# Patient Record
Sex: MALE | Race: WHITE | NOT HISPANIC OR LATINO | Employment: FULL TIME | URBAN - METROPOLITAN AREA
[De-identification: names, ages, dates, MRNs, and addresses within clinical notes are randomized per-mention and may not be internally consistent; named-entity substitution may affect disease eponyms.]

---

## 2017-03-13 PROBLEM — B07.9 WARTS: Status: ACTIVE | Noted: 2017-03-13

## 2017-03-14 ENCOUNTER — OFFICE VISIT (OUTPATIENT)
Dept: FAMILY MEDICINE CLINIC | Facility: CLINIC | Age: 32
End: 2017-03-14

## 2017-03-14 VITALS
SYSTOLIC BLOOD PRESSURE: 112 MMHG | HEIGHT: 67 IN | OXYGEN SATURATION: 98 % | DIASTOLIC BLOOD PRESSURE: 80 MMHG | HEART RATE: 81 BPM | TEMPERATURE: 98.5 F | WEIGHT: 180 LBS | BODY MASS INDEX: 28.25 KG/M2

## 2017-03-14 DIAGNOSIS — L23.7 POISON IVY DERMATITIS: Primary | ICD-10-CM

## 2017-03-14 PROBLEM — L25.9 CD (CONTACT DERMATITIS): Status: ACTIVE | Noted: 2017-03-14

## 2017-03-14 PROCEDURE — 96372 THER/PROPH/DIAG INJ SC/IM: CPT | Performed by: NURSE PRACTITIONER

## 2017-03-14 PROCEDURE — 99213 OFFICE O/P EST LOW 20 MIN: CPT | Performed by: NURSE PRACTITIONER

## 2017-03-14 RX ORDER — METHYLPREDNISOLONE ACETATE 40 MG/ML
40 INJECTION, SUSPENSION INTRA-ARTICULAR; INTRALESIONAL; INTRAMUSCULAR; SOFT TISSUE ONCE
Status: COMPLETED | OUTPATIENT
Start: 2017-03-14 | End: 2017-03-14

## 2017-03-14 RX ADMIN — METHYLPREDNISOLONE ACETATE 40 MG: 40 INJECTION, SUSPENSION INTRA-ARTICULAR; INTRALESIONAL; INTRAMUSCULAR; SOFT TISSUE at 11:33

## 2017-03-14 NOTE — PATIENT INSTRUCTIONS
Fat and Cholesterol Restricted Diet  High levels of fat and cholesterol in your blood may lead to various health problems, such as diseases of the heart, blood vessels, gallbladder, liver, and pancreas. Fats are concentrated sources of energy that come in various forms. Certain types of fat, including saturated fat, may be harmful in excess. Cholesterol is a substance needed by your body in small amounts. Your body makes all the cholesterol it needs. Excess cholesterol comes from the food you eat.  When you have high levels of cholesterol and saturated fat in your blood, health problems can develop because the excess fat and cholesterol will gather along the walls of your blood vessels, causing them to narrow. Choosing the right foods will help you control your intake of fat and cholesterol. This will help keep the levels of these substances in your blood within normal limits and reduce your risk of disease.  WHAT IS MY PLAN?  Your health care provider recommends that you:  · Get no more than __________ % of the total calories in your daily diet from fat.  · Limit your intake of saturated fat to less than ______% of your total calories each day.  · Limit the amount of cholesterol in your diet to less than _________mg per day.  WHAT TYPES OF FAT SHOULD I CHOOSE?  · Choose healthy fats more often. Choose monounsaturated and polyunsaturated fats, such as olive and canola oil, flaxseeds, walnuts, almonds, and seeds.  · Eat more omega-3 fats. Good choices include salmon, mackerel, sardines, tuna, flaxseed oil, and ground flaxseeds. Aim to eat fish at least two times a week.  · Limit saturated fats. Saturated fats are primarily found in animal products, such as meats, butter, and cream. Plant sources of saturated fats include palm oil, palm kernel oil, and coconut oil.  · Avoid foods with partially hydrogenated oils in them. These contain trans fats. Examples of foods that contain trans fats are stick margarine, some tub  "margarines, cookies, crackers, and other baked goods.  WHAT GENERAL GUIDELINES DO I NEED TO FOLLOW?  These guidelines for healthy eating will help you control your intake of fat and cholesterol:  · Check food labels carefully to identify foods with trans fats or high amounts of saturated fat.  · Fill one half of your plate with vegetables and green salads.  · Fill one fourth of your plate with whole grains. Look for the word \"whole\" as the first word in the ingredient list.  · Fill one fourth of your plate with lean protein foods.  · Limit fruit to two servings a day. Choose fruit instead of juice.  · Eat more foods that contain soluble fiber. Examples of foods that contain this type of fiber are apples, broccoli, carrots, beans, peas, and barley. Aim to get 20-30 g of fiber per day.  · Eat more home-cooked food and less restaurant, buffet, and fast food.  · Limit or avoid alcohol.  · Limit foods high in starch and sugar.  · Limit fried foods.  · Cook foods using methods other than frying. Baking, boiling, grilling, and broiling are all great options.  · Lose weight if you are overweight. Losing just 5-10% of your initial body weight can help your overall health and prevent diseases such as diabetes and heart disease.  WHAT FOODS CAN I EAT?  Grains  Whole grains, such as whole wheat or whole grain breads, crackers, cereals, and pasta. Unsweetened oatmeal, bulgur, barley, quinoa, or brown rice. Corn or whole wheat flour tortillas.  Vegetables  Fresh or frozen vegetables (raw, steamed, roasted, or grilled). Green salads.  Fruits  All fresh, canned (in natural juice), or frozen fruits.  Meat and Other Protein Products  Ground beef (85% or leaner), grass-fed beef, or beef trimmed of fat. Skinless chicken or turkey. Ground chicken or turkey. Pork trimmed of fat. All fish and seafood. Eggs. Dried beans, peas, or lentils. Unsalted nuts or seeds. Unsalted canned or dry beans.  Dairy  Low-fat dairy products, such as skim or " 1% milk, 2% or reduced-fat cheeses, low-fat ricotta or cottage cheese, or plain low-fat yogurt.  Fats and Oils  Tub margarines without trans fats. Light or reduced-fat mayonnaise and salad dressings. Avocado. Olive, canola, sesame, or safflower oils. Natural peanut or almond butter (choose ones without added sugar and oil).  The items listed above may not be a complete list of recommended foods or beverages. Contact your dietitian for more options.  WHAT FOODS ARE NOT RECOMMENDED?  Grains  White bread. White pasta. White rice. Cornbread. Bagels, pastries, and croissants. Crackers that contain trans fat.  Vegetables  White potatoes. Corn. Creamed or fried vegetables. Vegetables in a cheese sauce.  Fruits  Dried fruits. Canned fruit in light or heavy syrup. Fruit juice.  Meat and Other Protein Products  Fatty cuts of meat. Ribs, chicken wings, adasm, sausage, bologna, salami, chitterlings, fatback, hot dogs, bratwurst, and packaged luncheon meats. Liver and organ meats.  Dairy  Whole or 2% milk, cream, half-and-half, and cream cheese. Whole milk cheeses. Whole-fat or sweetened yogurt. Full-fat cheeses. Nondairy creamers and whipped toppings. Processed cheese, cheese spreads, or cheese curds.  Sweets and Desserts  Corn syrup, sugars, honey, and molasses. Candy. Jam and jelly. Syrup. Sweetened cereals. Cookies, pies, cakes, donuts, muffins, and ice cream.  Fats and Oils  Butter, stick margarine, lard, shortening, ghee, or adams fat. Coconut, palm kernel, or palm oils.  Beverages  Alcohol. Sweetened drinks (such as sodas, lemonade, and fruit drinks or punches).  The items listed above may not be a complete list of foods and beverages to avoid. Contact your dietitian for more information.     This information is not intended to replace advice given to you by your health care provider. Make sure you discuss any questions you have with your health care provider.     Document Released: 12/18/2006 Document Revised: 01/08/2016  Document Reviewed: 03/18/2015  [employer] Interactive Patient Education ©2016 [employer]  Exercising to Lose Weight  Exercising can help you to lose weight. In order to lose weight through exercise, you need to do vigorous-intensity exercise. You can tell that you are exercising with vigorous intensity if you are breathing very hard and fast and cannot hold a conversation while exercising.  Moderate-intensity exercise helps to maintain your current weight. You can tell that you are exercising at a moderate level if you have a higher heart rate and faster breathing, but you are still able to hold a conversation.  HOW OFTEN SHOULD I EXERCISE?  Choose an activity that you enjoy and set realistic goals. Your health care provider can help you to make an activity plan that works for you. Exercise regularly as directed by your health care provider. This may include:  · Doing resistance training twice each week, such as:    Push-ups.    Sit-ups.    Lifting weights.    Using resistance bands.  · Doing a given intensity of exercise for a given amount of time. Choose from these options:    150 minutes of moderate-intensity exercise every week.    75 minutes of vigorous-intensity exercise every week.    A mix of moderate-intensity and vigorous-intensity exercise every week.  Children, pregnant women, people who are out of shape, people who are overweight, and older adults may need to consult a health care provider for individual recommendations. If you have any sort of medical condition, be sure to consult your health care provider before starting a new exercise program.  WHAT ARE SOME ACTIVITIES THAT CAN HELP ME TO LOSE WEIGHT?   · Walking at a rate of at least 4.5 miles an hour.  · Jogging or running at a rate of 5 miles per hour.  · Biking at a rate of at least 10 miles per hour.  · Lap swimming.  · Roller-skating or in-line skating.  · Cross-country skiing.  · Vigorous competitive sports, such as football, basketball, and  soccer.  · Jumping rope.  · Aerobic dancing.  HOW CAN I BE MORE ACTIVE IN MY DAY-TO-DAY ACTIVITIES?  · Use the stairs instead of the elevator.  · Take a walk during your lunch break.  · If you drive, park your car farther away from work or school.  · If you take public transportation, get off one stop early and walk the rest of the way.  · Make all of your phone calls while standing up and walking around.  · Get up, stretch, and walk around every 30 minutes throughout the day.  WHAT GUIDELINES SHOULD I FOLLOW WHILE EXERCISING?  · Do not exercise so much that you hurt yourself, feel dizzy, or get very short of breath.  · Consult your health care provider prior to starting a new exercise program.  · Wear comfortable clothes and shoes with good support.  · Drink plenty of water while you exercise to prevent dehydration or heat stroke. Body water is lost during exercise and must be replaced.  · Work out until you breathe faster and your heart beats faster.     This information is not intended to replace advice given to you by your health care provider. Make sure you discuss any questions you have with your health care provider.     Document Released: 01/20/2012 Document Revised: 01/08/2016 Document Reviewed: 05/21/2015  [employer] Interactive Patient Education ©2016 [employer]

## 2017-06-12 ENCOUNTER — OFFICE VISIT (OUTPATIENT)
Dept: FAMILY MEDICINE CLINIC | Facility: CLINIC | Age: 32
End: 2017-06-12

## 2017-06-12 VITALS
OXYGEN SATURATION: 97 % | SYSTOLIC BLOOD PRESSURE: 104 MMHG | BODY MASS INDEX: 27 KG/M2 | HEIGHT: 67 IN | DIASTOLIC BLOOD PRESSURE: 76 MMHG | HEART RATE: 65 BPM | TEMPERATURE: 99.4 F | WEIGHT: 172 LBS

## 2017-06-12 DIAGNOSIS — B36.0 TINEA VERSICOLOR: Primary | ICD-10-CM

## 2017-06-12 PROCEDURE — 99213 OFFICE O/P EST LOW 20 MIN: CPT | Performed by: NURSE PRACTITIONER

## 2017-06-12 RX ORDER — FLUCONAZOLE 100 MG/1
100 TABLET ORAL DAILY
Qty: 1 TABLET | Refills: 1 | Status: SHIPPED | OUTPATIENT
Start: 2017-06-12 | End: 2018-01-02

## 2017-06-12 RX ORDER — CLOTRIMAZOLE 1 %
CREAM (GRAM) TOPICAL 2 TIMES DAILY
Qty: 30 G | Refills: 1 | Status: SHIPPED | OUTPATIENT
Start: 2017-06-12 | End: 2018-01-02

## 2017-06-12 NOTE — PROGRESS NOTES
Eber Brito is a 32 y.o. male.     History of Present Illness Patient complains of white, non-pruritic rash on right shoulder for one month. It is now spreading up onto his neck. No treatment.    The following portions of the patient's history were reviewed and updated as appropriate: allergies, current medications, past family history, past medical history, past social history, past surgical history and problem list.    Review of Systems   Constitutional: Negative for fatigue, fever and unexpected weight change.   HENT: Negative for congestion, hearing loss, nosebleeds, rhinorrhea, sore throat, trouble swallowing and voice change.    Eyes: Negative for pain, discharge, redness and visual disturbance.   Respiratory: Negative for cough, chest tightness, shortness of breath and wheezing.    Cardiovascular: Negative for chest pain, palpitations and leg swelling.   Gastrointestinal: Negative for abdominal distention, abdominal pain, anal bleeding, blood in stool, constipation, diarrhea, nausea and vomiting.   Endocrine: Negative for cold intolerance, heat intolerance, polydipsia, polyphagia and polyuria.   Genitourinary: Negative for dysuria, flank pain, frequency and hematuria.   Musculoskeletal: Negative for arthralgias, gait problem, joint swelling and myalgias.   Skin: Positive for rash. Negative for color change.   Neurological: Negative for dizziness, tremors, seizures, syncope, speech difficulty, weakness, numbness and headaches.   Hematological: Negative.    Psychiatric/Behavioral: Negative.        Objective   Physical Exam   Constitutional: He is oriented to person, place, and time. He appears well-developed and well-nourished. No distress.   HENT:   Head: Normocephalic and atraumatic.   Right Ear: External ear normal.   Left Ear: External ear normal.   Nose: Nose normal.   Eyes: Conjunctivae are normal. Pupils are equal, round, and reactive to light. Right eye exhibits no discharge. Left eye  exhibits no discharge. No scleral icterus.   Neck: Neck supple.   Cardiovascular: Normal rate and regular rhythm.    Pulmonary/Chest: Effort normal.   Musculoskeletal: He exhibits no edema or deformity.   Neurological: He is alert and oriented to person, place, and time. He exhibits normal muscle tone. Coordination normal.   Skin: Skin is warm and dry. Rash noted.   Right shoulder and neck with faint areas of hypopigmentation.   Psychiatric: He has a normal mood and affect. His behavior is normal. Judgment and thought content normal.   Nursing note and vitals reviewed.      Assessment/Plan   Josh was seen today for rash.    Diagnoses and all orders for this visit:    Tinea versicolor  -     clotrimazole (LOTRIMIN) 1 % cream; Apply  topically 2 (Two) Times a Day.  -     fluconazole (DIFLUCAN) 100 MG tablet; Take 1 tablet by mouth Daily.  -     Ambulatory Referral to Dermatology      Discussed the nature of the disease including, risks, complications, implications, management, safe and proper use of medications. Encouraged keeping scheduled follow up appointments with me and any other providers. Encouraged patient to have appointment for complete physical, fasting labs, appropriate screenings, and immunizations on an annual basis.

## 2018-01-02 ENCOUNTER — OFFICE VISIT (OUTPATIENT)
Dept: FAMILY MEDICINE CLINIC | Facility: CLINIC | Age: 33
End: 2018-01-02

## 2018-01-02 VITALS
HEIGHT: 67 IN | OXYGEN SATURATION: 96 % | DIASTOLIC BLOOD PRESSURE: 82 MMHG | HEART RATE: 84 BPM | RESPIRATION RATE: 16 BRPM | WEIGHT: 181 LBS | SYSTOLIC BLOOD PRESSURE: 126 MMHG | TEMPERATURE: 98.6 F | BODY MASS INDEX: 28.41 KG/M2

## 2018-01-02 DIAGNOSIS — J01.80 OTHER ACUTE SINUSITIS, RECURRENCE NOT SPECIFIED: Primary | ICD-10-CM

## 2018-01-02 PROCEDURE — 99213 OFFICE O/P EST LOW 20 MIN: CPT | Performed by: NURSE PRACTITIONER

## 2018-01-02 RX ORDER — LORATADINE 10 MG/1
10 TABLET ORAL DAILY
COMMUNITY
End: 2018-05-21

## 2018-01-02 RX ORDER — AMOXICILLIN AND CLAVULANATE POTASSIUM 875; 125 MG/1; MG/1
1 TABLET, FILM COATED ORAL EVERY 12 HOURS SCHEDULED
Qty: 20 TABLET | Refills: 0 | Status: SHIPPED | OUTPATIENT
Start: 2018-01-02 | End: 2018-05-21

## 2018-01-02 RX ORDER — GUAIFENESIN 600 MG/1
1200 TABLET, EXTENDED RELEASE ORAL 2 TIMES DAILY
COMMUNITY
End: 2019-01-11

## 2018-01-02 NOTE — PROGRESS NOTES
Eber Brito is a 32 y.o. male.     History of Present Illness 2 week history of nasal congestion with green nasal discharge and now has a cough at night. Cough is productive of green sputum. No chest pain, sob or wheezing. Wife had URI last week. Treated with Mucinex. No fever.    The following portions of the patient's history were reviewed and updated as appropriate: allergies, current medications, past family history, past medical history, past social history, past surgical history and problem list.    Review of Systems   Constitutional: Negative for fatigue, fever and unexpected weight change.   HENT: Positive for congestion, postnasal drip and rhinorrhea. Negative for hearing loss, nosebleeds, sore throat, trouble swallowing and voice change.    Eyes: Negative for pain, discharge, redness and visual disturbance.   Respiratory: Positive for cough. Negative for chest tightness, shortness of breath and wheezing.    Cardiovascular: Negative for chest pain, palpitations and leg swelling.   Gastrointestinal: Negative for abdominal distention, abdominal pain, anal bleeding, blood in stool, constipation, diarrhea, nausea and vomiting.   Endocrine: Negative for cold intolerance, heat intolerance, polydipsia, polyphagia and polyuria.   Genitourinary: Negative for dysuria, flank pain, frequency and hematuria.   Musculoskeletal: Negative for arthralgias, gait problem, joint swelling and myalgias.   Skin: Negative for color change and rash.   Neurological: Negative for dizziness, tremors, seizures, syncope, speech difficulty, weakness, numbness and headaches.   Hematological: Negative.    Psychiatric/Behavioral: Negative.        Objective   Physical Exam   Constitutional: He is oriented to person, place, and time. He appears well-developed and well-nourished. No distress.   HENT:   Head: Normocephalic and atraumatic.   Right Ear: External ear normal.   Left Ear: External ear normal.   Nose: Nose normal.    Mouth/Throat: Oropharynx is clear and moist. No oropharyngeal exudate.   Eyes: Conjunctivae are normal. Right eye exhibits no discharge. Left eye exhibits no discharge. No scleral icterus.   Neck: Normal range of motion. Neck supple. No thyromegaly present.   Cardiovascular: Normal rate, regular rhythm, normal heart sounds and intact distal pulses.  Exam reveals no gallop and no friction rub.    No murmur heard.  Pulmonary/Chest: Effort normal and breath sounds normal. No respiratory distress. He has no wheezes. He has no rales.   Abdominal: Soft. Bowel sounds are normal. He exhibits no distension and no mass. There is no tenderness. There is no rebound and no guarding.   Musculoskeletal: Normal range of motion. He exhibits no edema or deformity.   Lymphadenopathy:     He has no cervical adenopathy.   Neurological: He is alert and oriented to person, place, and time. He has normal reflexes. He displays normal reflexes. No cranial nerve deficit. Coordination normal.   Skin: Skin is warm and dry. No rash noted.   Psychiatric: He has a normal mood and affect. His behavior is normal. Judgment and thought content normal.   Vitals reviewed.      Assessment/Plan   Josh was seen today for sinusitis.    Diagnoses and all orders for this visit:    Other acute sinusitis, recurrence not specified  -     amoxicillin-clavulanate (AUGMENTIN) 875-125 MG per tablet; Take 1 tablet by mouth Every 12 (Twelve) Hours.    OTC cough meds. Follow up symptoms persist or worsen.  Increase fluids. Symptomatic treatment.

## 2018-03-14 ENCOUNTER — OFFICE VISIT (OUTPATIENT)
Dept: FAMILY MEDICINE CLINIC | Facility: CLINIC | Age: 33
End: 2018-03-14

## 2018-03-14 VITALS
OXYGEN SATURATION: 98 % | RESPIRATION RATE: 16 BRPM | TEMPERATURE: 98.7 F | HEIGHT: 67 IN | WEIGHT: 182 LBS | BODY MASS INDEX: 28.56 KG/M2 | SYSTOLIC BLOOD PRESSURE: 130 MMHG | DIASTOLIC BLOOD PRESSURE: 80 MMHG | HEART RATE: 80 BPM

## 2018-03-14 DIAGNOSIS — L30.0 NUMMULAR ECZEMA: Primary | ICD-10-CM

## 2018-03-14 PROCEDURE — 99213 OFFICE O/P EST LOW 20 MIN: CPT | Performed by: NURSE PRACTITIONER

## 2018-03-14 NOTE — PROGRESS NOTES
Eber Brito is a 33 y.o. male.     History of Present Illness Complains of faint rash on left side of face under ear for 2 months. Will get brighter if he is hot. Non-pruritic. No other rashes. No one else in the home has this rash. No treatment.    The following portions of the patient's history were reviewed and updated as appropriate: allergies, current medications, past family history, past medical history, past social history, past surgical history and problem list.    Review of Systems   Constitutional: Negative for appetite change, fatigue and fever.   HENT: Negative for congestion and sore throat.    Eyes: Negative for visual disturbance.   Respiratory: Negative for cough.    Cardiovascular: Negative for chest pain.   Gastrointestinal: Negative for abdominal pain.   Endocrine: Negative for cold intolerance and heat intolerance.   Musculoskeletal: Negative for myalgias.   Skin: Positive for rash.   Allergic/Immunologic: Negative for environmental allergies.   Hematological: Negative for adenopathy. Does not bruise/bleed easily.       Objective   Physical Exam   Constitutional: He is oriented to person, place, and time. He appears well-developed and well-nourished. No distress.   HENT:   Head: Normocephalic and atraumatic.   Right Ear: Tympanic membrane and external ear normal.   Left Ear: Tympanic membrane and external ear normal.   Nose: Nose normal.   Mouth/Throat: No oropharyngeal exudate.   Eyes: Conjunctivae are normal. Pupils are equal, round, and reactive to light. Right eye exhibits no discharge. Left eye exhibits no discharge. No scleral icterus.   Neck: Neck supple. No thyromegaly present.   Cardiovascular: Normal rate and regular rhythm.    Pulmonary/Chest: Effort normal.   Musculoskeletal: He exhibits no edema or deformity.   Lymphadenopathy:     He has no cervical adenopathy.   Neurological: He is alert and oriented to person, place, and time. Coordination normal.   Skin: Skin is  warm and dry. Capillary refill takes less than 2 seconds. Rash noted. No erythema.   Under left ear and angle of jaw is faint pink rash consistent with eczema.   Psychiatric: He has a normal mood and affect. His speech is normal and behavior is normal. Judgment and thought content normal.   Nursing note and vitals reviewed.        Assessment/Plan   Josh was seen today for rash.    Diagnoses and all orders for this visit:    Nummular eczema    Other orders  -     hydrocortisone 2.5 % ointment; Apply  topically 2 (Two) Times a Day.      Discussed the nature of the disease including, risks, complications, implications, management, safe and proper use of medications.   Follow up symptoms persist or worsen.

## 2018-05-21 ENCOUNTER — OFFICE VISIT (OUTPATIENT)
Dept: FAMILY MEDICINE CLINIC | Facility: CLINIC | Age: 33
End: 2018-05-21

## 2018-05-21 VITALS
HEART RATE: 77 BPM | TEMPERATURE: 98.3 F | OXYGEN SATURATION: 98 % | SYSTOLIC BLOOD PRESSURE: 124 MMHG | DIASTOLIC BLOOD PRESSURE: 70 MMHG | BODY MASS INDEX: 28.56 KG/M2 | WEIGHT: 182 LBS | RESPIRATION RATE: 16 BRPM | HEIGHT: 67 IN

## 2018-05-21 DIAGNOSIS — F51.02 ADJUSTMENT INSOMNIA: ICD-10-CM

## 2018-05-21 DIAGNOSIS — R21 RASH: Primary | ICD-10-CM

## 2018-05-21 DIAGNOSIS — M54.50 ACUTE MIDLINE LOW BACK PAIN WITHOUT SCIATICA: ICD-10-CM

## 2018-05-21 PROCEDURE — 99214 OFFICE O/P EST MOD 30 MIN: CPT | Performed by: NURSE PRACTITIONER

## 2018-05-21 RX ORDER — BETAMETHASONE DIPROPIONATE 0.5 MG/G
CREAM TOPICAL DAILY
Qty: 15 G | Refills: 1 | Status: SHIPPED | OUTPATIENT
Start: 2018-05-21 | End: 2019-01-11

## 2018-05-21 RX ORDER — CYCLOBENZAPRINE HCL 10 MG
10 TABLET ORAL 3 TIMES DAILY PRN
Qty: 60 TABLET | Refills: 1 | Status: SHIPPED | OUTPATIENT
Start: 2018-05-21 | End: 2019-01-11

## 2018-05-21 RX ORDER — CETIRIZINE HYDROCHLORIDE 10 MG/1
10 TABLET ORAL DAILY
COMMUNITY
End: 2020-02-14

## 2018-05-21 NOTE — PROGRESS NOTES
Eber Brito is a 33 y.o. male.     History of Present Illness   1. Injured back 3 days ago while lifting weights. No immediate pain. Back feels tight. Sharp pain radiating to right knee. Treated with Ibuprofen. Pain 5-10. No numbness or tingling. Has had back in the past but not this severe.  2. Rash if he strains. It is behind left ear. Usually self-limiting. Has been treated in the past. Non-pruritic.   3. Has insomnia. Works 3rd shift. Uses black out curtain. OTC meds don't help. Can get to sleep but can't stay asleep.      The following portions of the patient's history were reviewed and updated as appropriate: allergies, current medications, past family history, past medical history, past social history, past surgical history and problem list.    Review of Systems   Constitutional: Negative for appetite change, fever and unexpected weight loss.   HENT: Negative for nosebleeds, sore throat and trouble swallowing.    Eyes: Negative for visual disturbance.   Respiratory: Negative for cough, shortness of breath and wheezing.    Cardiovascular: Negative for chest pain, palpitations and leg swelling.   Gastrointestinal: Negative for abdominal pain, blood in stool, constipation, diarrhea, nausea and vomiting.   Endocrine: Negative for polydipsia, polyphagia and polyuria.   Genitourinary: Negative for urinary incontinence, dysuria, frequency and hematuria.   Musculoskeletal: Positive for back pain. Negative for arthralgias, gait problem, joint swelling and myalgias.   Skin: Positive for rash.   Neurological: Negative for dizziness, seizures, syncope and numbness.   Hematological: Negative for adenopathy. Does not bruise/bleed easily.   Psychiatric/Behavioral: Positive for sleep disturbance. Negative for depressed mood. The patient is not nervous/anxious.        Objective   Physical Exam   Constitutional: He is oriented to person, place, and time. He appears well-developed and well-nourished. No  distress.   HENT:   Head: Normocephalic and atraumatic.   Right Ear: Tympanic membrane and external ear normal.   Left Ear: Tympanic membrane and external ear normal.   Nose: Nose normal.   Mouth/Throat: Oropharynx is clear and moist. No oropharyngeal exudate.   Eyes: Conjunctivae are normal. Pupils are equal, round, and reactive to light. Right eye exhibits no discharge. Left eye exhibits no discharge. No scleral icterus.   Neck: Neck supple. No tracheal deviation present. No thyromegaly present.   Cardiovascular: Normal rate, regular rhythm and normal heart sounds.  Exam reveals no gallop and no friction rub.    No murmur heard.  Pulmonary/Chest: Effort normal and breath sounds normal. No respiratory distress. He has no wheezes.   Abdominal: Soft. Bowel sounds are normal. He exhibits no distension and no mass. There is no tenderness.   Musculoskeletal: Normal range of motion. He exhibits no edema, tenderness or deformity.   Neg SLR test bilat. +2 patellar DTR bilat. Strong dorsiflexion of toes. Heel/ toe gait intact. No pain with palp of si joint or sciatic notch.     Lymphadenopathy:     He has no cervical adenopathy.   Neurological: He is alert and oriented to person, place, and time. Coordination normal.   Skin: Skin is warm and dry. Capillary refill takes less than 2 seconds. No rash noted. No erythema.   No lesion visible behind left ear. Photo pt brings just shows flat erythema.   Psychiatric: He has a normal mood and affect. His speech is normal and behavior is normal. Judgment and thought content normal.   Nursing note and vitals reviewed.        Assessment/Plan   Josh was seen today for back pain, rash and insomnia.    Diagnoses and all orders for this visit:    Rash  -     betamethasone dipropionate (DIPROLENE) 0.05 % cream; Apply  topically Daily.    Acute midline low back pain without sciatica  -     cyclobenzaprine (FLEXERIL) 10 MG tablet; Take 1 tablet by mouth 3 (Three) Times a Day As Needed for  Muscle Spasms.    Adjustment insomnia  -     Melatonin 5 MG capsule; Take 5 mg by mouth Every Night.    Use OTC Aleve 2 bid with food.  Rest, heat and ice.  Off work 3 days.  Discussed sleep hygiene.  Discussed the nature of the disease including, risks, complications, implications, management, safe and proper use of medications. Encouraged therapeutic lifestyle changes including low calorie diet with plenty of fruits and vegetables, daily exercise, medication compliance, and keeping scheduled follow up appointments with me and any other providers. Encouraged patient to have appointment for complete physical, fasting labs, appropriate screenings, and immunizations on an annual basis.  Follow up symptoms persist or worsen.  Return for Work excuse x 3 days..

## 2018-05-23 ENCOUNTER — TELEPHONE (OUTPATIENT)
Dept: FAMILY MEDICINE CLINIC | Facility: CLINIC | Age: 33
End: 2018-05-23

## 2018-05-23 DIAGNOSIS — M54.40 ACUTE MIDLINE LOW BACK PAIN WITH SCIATICA, SCIATICA LATERALITY UNSPECIFIED: Primary | ICD-10-CM

## 2018-05-23 RX ORDER — METHYLPREDNISOLONE 4 MG/1
TABLET ORAL
Qty: 21 TABLET | Refills: 0 | Status: SHIPPED | OUTPATIENT
Start: 2018-05-23 | End: 2019-01-11

## 2018-05-23 NOTE — TELEPHONE ENCOUNTER
----- Message from Yaquelin Marcial MA sent at 5/23/2018 10:15 AM EDT -----  Regarding: FW: STILL IN PAIN AND WORK NOTE  Contact: [phone]      ----- Message -----  From: Alysha Lyn  Sent: 5/23/2018  10:07 AM  To: Yaquelin Marcial MA  Subject: STILL IN PAIN AND WORK NOTE                      YUNG, PT WIFE, CALLED AND SAID THAT THE PT IS IN A LOT OF PAIN AND THE MEDS ARE NOT HELPING. IS THERE ANYTHING ELSE THAT CAN BE DONE? THE PT IS ALSO SUPPOSED TO GO BACK TO WORK ON Thursday, AND AT THIS POINT IS NOT GOING TO BE ABLE TO, CAN HE GET A LONGER WORK EXCUSE? HE IS NOT SURE WHEN HE CAN GO BACK AT THIS POINT.    [facility]

## 2018-05-23 NOTE — TELEPHONE ENCOUNTER
Pt informed that provider has sent in mediation and he needs to go get a x-ray.       ----- Message from Ayana Youngblood DNP, APRN sent at 5/23/2018  1:22 PM EDT -----  Regarding: RE: STILL IN PAIN AND WORK NOTE  Contact: [phone]  Let them know I am sending in steroids and he needs to go get an x-ray  ----- Message -----  From: Yaquelin Marcial MA  Sent: 5/23/2018  10:15 AM  To: Ayana Youngblood DNP, APRN  Subject: FW: STILL IN PAIN AND WORK NOTE                      ----- Message -----  From: Alysha Lyn  Sent: 5/23/2018  10:07 AM  To: Yaquelin Marcial MA  Subject: STILL IN PAIN AND WORK NOTE                      YUNG, PT WIFE, CALLED AND SAID THAT THE PT IS IN A LOT OF PAIN AND THE MEDS ARE NOT HELPING. IS THERE ANYTHING ELSE THAT CAN BE DONE? THE PT IS ALSO SUPPOSED TO GO BACK TO WORK ON Thursday, AND AT THIS POINT IS NOT GOING TO BE ABLE TO, CAN HE GET A LONGER WORK EXCUSE? HE IS NOT SURE WHEN HE CAN GO BACK AT THIS POINT.    [facility]

## 2019-01-11 ENCOUNTER — OFFICE VISIT (OUTPATIENT)
Dept: FAMILY MEDICINE CLINIC | Facility: CLINIC | Age: 34
End: 2019-01-11

## 2019-01-11 VITALS
DIASTOLIC BLOOD PRESSURE: 78 MMHG | TEMPERATURE: 98.7 F | HEART RATE: 68 BPM | RESPIRATION RATE: 16 BRPM | HEIGHT: 67 IN | BODY MASS INDEX: 27.47 KG/M2 | SYSTOLIC BLOOD PRESSURE: 142 MMHG | OXYGEN SATURATION: 96 % | WEIGHT: 175 LBS

## 2019-01-11 DIAGNOSIS — R30.0 DYSURIA: ICD-10-CM

## 2019-01-11 DIAGNOSIS — N39.0 URINARY TRACT INFECTION WITH HEMATURIA, SITE UNSPECIFIED: Primary | ICD-10-CM

## 2019-01-11 DIAGNOSIS — R31.9 URINARY TRACT INFECTION WITH HEMATURIA, SITE UNSPECIFIED: Primary | ICD-10-CM

## 2019-01-11 LAB
BILIRUB BLD-MCNC: NEGATIVE MG/DL
CLARITY, POC: ABNORMAL
COLOR UR: ABNORMAL
GLUCOSE UR STRIP-MCNC: NEGATIVE MG/DL
KETONES UR QL: ABNORMAL
LEUKOCYTE EST, POC: ABNORMAL
NITRITE UR-MCNC: POSITIVE MG/ML
PH UR: 7 [PH] (ref 5–8)
PROT UR STRIP-MCNC: ABNORMAL MG/DL
RBC # UR STRIP: ABNORMAL /UL
SP GR UR: 1.02 (ref 1–1.03)
UROBILINOGEN UR QL: NORMAL

## 2019-01-11 PROCEDURE — 81003 URINALYSIS AUTO W/O SCOPE: CPT | Performed by: NURSE PRACTITIONER

## 2019-01-11 PROCEDURE — 87086 URINE CULTURE/COLONY COUNT: CPT | Performed by: NURSE PRACTITIONER

## 2019-01-11 PROCEDURE — 87186 SC STD MICRODIL/AGAR DIL: CPT | Performed by: NURSE PRACTITIONER

## 2019-01-11 PROCEDURE — 99214 OFFICE O/P EST MOD 30 MIN: CPT | Performed by: NURSE PRACTITIONER

## 2019-01-11 PROCEDURE — 87077 CULTURE AEROBIC IDENTIFY: CPT | Performed by: NURSE PRACTITIONER

## 2019-01-11 RX ORDER — SULFAMETHOXAZOLE AND TRIMETHOPRIM 800; 160 MG/1; MG/1
1 TABLET ORAL 2 TIMES DAILY
Qty: 14 TABLET | Refills: 0 | Status: SHIPPED | OUTPATIENT
Start: 2019-01-11 | End: 2019-01-13 | Stop reason: ALTCHOICE

## 2019-01-11 NOTE — PATIENT INSTRUCTIONS
Urinary Tract Infection, Adult  A urinary tract infection (UTI) is an infection of any part of the urinary tract, which includes the kidneys, ureters, bladder, and urethra. These organs make, store, and get rid of urine in the body. UTI can be a bladder infection (cystitis) or kidney infection (pyelonephritis).  What are the causes?  This infection may be caused by fungi, viruses, or bacteria. Bacteria are the most common cause of UTIs. This condition can also be caused by repeated incomplete emptying of the bladder during urination.  What increases the risk?  This condition is more likely to develop if:  · You ignore your need to urinate or hold urine for long periods of time.  · You do not empty your bladder completely during urination.  · You wipe back to front after urinating or having a bowel movement, if you are female.  · You are uncircumcised, if you are male.  · You are constipated.  · You have a urinary catheter that stays in place (indwelling).  · You have a weak defense (immune) system.  · You have a medical condition that affects your bowels, kidneys, or bladder.  · You have diabetes.  · You take antibiotic medicines frequently or for long periods of time, and the antibiotics no longer work well against certain types of infections (antibiotic resistance).  · You take medicines that irritate your urinary tract.  · You are exposed to chemicals that irritate your urinary tract.  · You are female.    What are the signs or symptoms?  Symptoms of this condition include:  · Fever.  · Frequent urination or passing small amounts of urine frequently.  · Needing to urinate urgently.  · Pain or burning with urination.  · Urine that smells bad or unusual.  · Cloudy urine.  · Pain in the lower abdomen or back.  · Trouble urinating.  · Blood in the urine.  · Vomiting or being less hungry than normal.  · Diarrhea or abdominal pain.  · Vaginal discharge, if you are female.    How is this diagnosed?  This condition is  diagnosed with a medical history and physical exam. You will also need to provide a urine sample to test your urine. Other tests may be done, including:  · Blood tests.  · Sexually transmitted disease (STD) testing.    If you have had more than one UTI, a cystoscopy or imaging studies may be done to determine the cause of the infections.  How is this treated?  Treatment for this condition often includes a combination of two or more of the following:  · Antibiotic medicine.  · Other medicines to treat less common causes of UTI.  · Over-the-counter medicines to treat pain.  · Drinking enough water to stay hydrated.    Follow these instructions at home:  · Take over-the-counter and prescription medicines only as told by your health care provider.  · If you were prescribed an antibiotic, take it as told by your health care provider. Do not stop taking the antibiotic even if you start to feel better.  · Avoid alcohol, caffeine, tea, and carbonated beverages. They can irritate your bladder.  · Drink enough fluid to keep your urine clear or pale yellow.  · Keep all follow-up visits as told by your health care provider. This is important.  · Make sure to:  ? Empty your bladder often and completely. Do not hold urine for long periods of time.  ? Empty your bladder before and after sex.  ? Wipe from front to back after a bowel movement if you are female. Use each tissue one time when you wipe.  Contact a health care provider if:  · You have back pain.  · You have a fever.  · You feel nauseous or vomit.  · Your symptoms do not get better after 3 days.  · Your symptoms go away and then return.  Get help right away if:  · You have severe back pain or lower abdominal pain.  · You are vomiting and cannot keep down any medicines or water.  This information is not intended to replace advice given to you by your health care provider. Make sure you discuss any questions you have with your health care provider.  Document Released:  09/27/2006 Document Revised: 05/31/2017 Document Reviewed: 11/07/2016  [employer] Interactive Patient Education © 2018 [employer].  Urine Culture and Sensitivity Testing  Why am I having this test?  A urine culture is a test to see if germs grow from your urine sample. Normally, urine is free of germs (sterile). Germs in urine are usually bacteria. Sometimes they can be yeasts. These germs can cause a urinary tract infection (UTI).  You may have this test if you have symptoms of a UTI. These may include:  · Frequent urination.  · Burning pain when passing urine.    If you are pregnant, your health care provider may order this test to screen you for a UTI.  When you pass urine, the urine flows through the tube that empties your bladder (urethra). In men, urine comes out through an opening at the tip of the penis. In women, it comes out of the body from just above the vaginal opening. These areas may have bacteria near them that normally live on the skin (normal sherrie).  What kind of sample is taken?  A urine sample for a culture test must be collected in a way that keeps normal sherrie from getting into the sample. The method used most often is called a clean-catch sample. In a few cases, urine may need to be collected directly from the bladder using a thin, flexible tube (catheter). The health care provider puts the catheter through the person's urethra and into the bladder.  Your urine sample will be placed onto plates containing a substance that encourages bacteria to grow (agar plates). These plates are kept at body temperature for 24-48 hours to see if bacteria or other germs grow. Then, a  examines them under a microscope to check for germs.  Any germs that grow from the culture will be tested against a variety of medicines to find the one that works best (sensitivity testing). For a UTI caused by bacteria, several types of antibiotic medicines may be tested.  How do I prepare for this test?  · Do  not urinate for about an hour before collecting the sample.  · Drink a glass of water about 20 minutes before collecting the sample.  · Tell your health care provider if you have been taking antibiotics. This may affect the results of your test.  Your health care provider may give you sterile wipes to clean your vagina or penis to prepare for collecting a clean-catch sample. To collect the sample, you will need to do the following:  For Women and Girls  · Sit on the toilet and spread the lips of your vagina.  · Use one wipe to clean your vaginal area from front to back.  · Use a second wipe to clean the opening of your urethra.  · Pass a small amount of urine directly into the toilet while still spreading your vagina.  · Then, hold the sterile cup underneath you and urinate into it.  · Fill the cup about senior care. Cap it and return it for testing.  For Men and Boys  · Use the sterile wipe to clean the tip of your penis.  · Pass a small amount of urine directly into the toilet first.  · Then, urinate into the sterile cup.  · Fill the cup about senior care. Cap it and return it for testing.  What do the results mean?  The result of a urine culture and sensitivity test will be positive or negative.  · If enough bacteria grow from your urine sample, your test result is considered positive.  · If many different bacteria grow from your urine sample, your test may be reported as contaminated.  · If no bacteria grow from your sample after 24-48 hours, your test result is considered negative.  · Results of sensitivity testing let your health care provider know which medicines to use to treat your infection.    If the results of your urine culture are negative, this means:  · It is less likely that you have a UTI.  · Your test may be repeated if you still have symptoms.    If the results of your urine culture are positive, this means:  · It is more likely that you have a UTI.  · You may need to start treatment based on your  sensitivity results.    Talk to your health care provider to discuss your results, treatment options, and if necessary, the need for more tests.  It is your responsibility to obtain your test results. Ask the lab or department performing the test when and how you will get your results. Talk with your health care provider if you have any questions about your results.  Talk with your health care provider to discuss your results, treatment options, and if necessary, the need for more tests. Talk with your health care provider if you have any questions about your results.  This information is not intended to replace advice given to you by your health care provider. Make sure you discuss any questions you have with your health care provider.  Document Released: 01/12/2006 Document Revised: 08/23/2017 Document Reviewed: 04/16/2015  [employer] Interactive Patient Education © 2018 [employer]    Hematuria, Adult  Hematuria is blood in your urine. It can be caused by a bladder infection, kidney infection, prostate infection, kidney stone, or cancer of your urinary tract. Infections can usually be treated with medicine, and a kidney stone usually will pass through your urine. If neither of these is the cause of your hematuria, further workup to find out the reason may be needed.  It is very important that you tell your health care provider about any blood you see in your urine, even if the blood stops without treatment or happens without causing pain. Blood in your urine that happens and then stops and then happens again can be a symptom of a very serious condition. Also, pain is not a symptom in the initial stages of many urinary cancers.  Follow these instructions at home:  · Drink lots of fluid, 3-4 quarts a day. If you have been diagnosed with an infection, cranberry juice is especially recommended, in addition to large amounts of water.  · Avoid caffeine, tea, and carbonated beverages because they tend to irritate the  bladder.  · Avoid alcohol because it may irritate the prostate.  · Take all medicines as directed by your health care provider.  · If you were prescribed an antibiotic medicine, finish it all even if you start to feel better.  · If you have been diagnosed with a kidney stone, follow your health care provider's instructions regarding straining your urine to catch the stone.  · Empty your bladder often. Avoid holding urine for long periods of time.  · After a bowel movement, women should cleanse front to back. Use each tissue only once.  · Empty your bladder before and after sexual intercourse if you are a female.  Contact a health care provider if:  · You develop back pain.  · You have a fever.  · You have a feeling of sickness in your stomach (nausea) or vomiting.  · Your symptoms are not better in 3 days. Return sooner if you are getting worse.  Get help right away if:  · You develop severe vomiting and are unable to keep the medicine down.  · You develop severe back or abdominal pain despite taking your medicines.  · You begin passing a large amount of blood or clots in your urine.  · You feel extremely weak or faint, or you pass out.  This information is not intended to replace advice given to you by your health care provider. Make sure you discuss any questions you have with your health care provider.  Document Released: 12/18/2006 Document Revised: 05/25/2017 Document Reviewed: 08/18/2014  [employer] Interactive Patient Education © 2017 Else[employer] Inc.  Sulfamethoxazole; Trimethoprim, SMX-TMP tablets  What is this medicine?  SULFAMETHOXAZOLE; TRIMETHOPRIM or SMX-TMP (suhl fuh meth OK alison zohl; trye METH oh prim) is a combination of a sulfonamide antibiotic and a second antibiotic, trimethoprim. It is used to treat or prevent certain kinds of bacterial infections. It will not work for colds, flu, or other viral infections.  This medicine may be used for other purposes; ask your health care provider or pharmacist if  you have questions.  COMMON BRAND NAME(S): Bacter-Aid DS, Bactrim, Bactrim DS, Septra, Septra DS  What should I tell my health care provider before I take this medicine?  They need to know if you have any of these conditions:  -anemia  -asthma  -being treated with anticonvulsants  -if you frequently drink alcohol containing drinks  -kidney disease  -liver disease  -low level of folic acid or glucose-6-phosphate dehydrogenase  -poor nutrition or malabsorption  -porphyria  -severe allergies  -thyroid disorder  -an unusual or allergic reaction to sulfamethoxazole, trimethoprim, sulfa drugs, other medicines, foods, dyes, or preservatives  -pregnant or trying to get pregnant  -breast-feeding  How should I use this medicine?  Take this medicine by mouth with a full glass of water. Follow the directions on the prescription label. Take your medicine at regular intervals. Do not take it more often than directed. Do not skip doses or stop your medicine early.  Talk to your pediatrician regarding the use of this medicine in children. Special care may be needed. This medicine has been used in children as young as 2 months of age.  Overdosage: If you think you have taken too much of this medicine contact a poison control center or emergency room at once.  NOTE: This medicine is only for you. Do not share this medicine with others.  What if I miss a dose?  If you miss a dose, take it as soon as you can. If it is almost time for your next dose, take only that dose. Do not take double or extra doses.  What may interact with this medicine?  Do not take this medicine with any of the following medications:  -aminobenzoate potassium  -dofetilide  -metronidazole  This medicine may also interact with the following medications:  -ACE inhibitors like benazepril, enalapril, lisinopril, and ramipril  -birth control pills  -cyclosporine  -digoxin  -diuretics  -indomethacin  -medicines for  diabetes  -methenamine  -methotrexate  -phenytoin  -potassium supplements  -pyrimethamine  -sulfinpyrazone  -tricyclic antidepressants  -warfarin  This list may not describe all possible interactions. Give your health care provider a list of all the medicines, herbs, non-prescription drugs, or dietary supplements you use. Also tell them if you smoke, drink alcohol, or use illegal drugs. Some items may interact with your medicine.  What should I watch for while using this medicine?  Tell your doctor or health care professional if your symptoms do not improve. Drink several glasses of water a day to reduce the risk of kidney problems.  Do not treat diarrhea with over the counter products. Contact your doctor if you have diarrhea that lasts more than 2 days or if it is severe and watery.  This medicine can make you more sensitive to the sun. Keep out of the sun. If you cannot avoid being in the sun, wear protective clothing and use a sunscreen. Do not use sun lamps or tanning beds/booths.  What side effects may I notice from receiving this medicine?  Side effects that you should report to your doctor or health care professional as soon as possible:  -allergic reactions like skin rash or hives, swelling of the face, lips, or tongue  -breathing problems  -fever or chills, sore throat  -irregular heartbeat, chest pain  -joint or muscle pain  -pain or difficulty passing urine  -red pinpoint spots on skin  -redness, blistering, peeling or loosening of the skin, including inside the mouth  -unusual bleeding or bruising  -unusually weak or tired  -yellowing of the eyes or skin  Side effects that usually do not require medical attention (report to your doctor or health care professional if they continue or are bothersome):  -diarrhea  -dizziness  -headache  -loss of appetite  -nausea, vomiting  -nervousness  This list may not describe all possible side effects. Call your doctor for medical advice about side effects. You may  report side effects to FDA at 5-441-FDA-2928.  Where should I keep my medicine?  Keep out of the reach of children.  Store at room temperature between 20 to 25 degrees C (68 to 77 degrees F). Protect from light. Throw away any unused medicine after the expiration date.  NOTE: This sheet is a summary. It may not cover all possible information. If you have questions about this medicine, talk to your doctor, pharmacist, or health care provider.  © 2018 [employer] (2014-07-25 14:38:26)

## 2019-01-13 ENCOUNTER — TELEPHONE (OUTPATIENT)
Dept: FAMILY MEDICINE CLINIC | Facility: CLINIC | Age: 34
End: 2019-01-13

## 2019-01-13 DIAGNOSIS — N39.0 URINARY TRACT INFECTION WITH HEMATURIA, SITE UNSPECIFIED: ICD-10-CM

## 2019-01-13 DIAGNOSIS — R31.9 URINARY TRACT INFECTION WITH HEMATURIA, SITE UNSPECIFIED: Primary | ICD-10-CM

## 2019-01-13 DIAGNOSIS — R31.9 URINARY TRACT INFECTION WITH HEMATURIA, SITE UNSPECIFIED: ICD-10-CM

## 2019-01-13 DIAGNOSIS — N39.0 URINARY TRACT INFECTION WITH HEMATURIA, SITE UNSPECIFIED: Primary | ICD-10-CM

## 2019-01-13 LAB — BACTERIA SPEC AEROBE CULT: ABNORMAL

## 2019-01-13 RX ORDER — LEVOFLOXACIN 500 MG/1
500 TABLET, FILM COATED ORAL DAILY
Qty: 7 TABLET | Refills: 0 | Status: SHIPPED | OUTPATIENT
Start: 2019-01-13 | End: 2019-01-20

## 2019-01-13 RX ORDER — LEVOFLOXACIN 500 MG/1
500 TABLET, FILM COATED ORAL DAILY
Qty: 7 TABLET | Refills: 0 | Status: SHIPPED | OUTPATIENT
Start: 2019-01-13 | End: 2019-01-13 | Stop reason: SDUPTHER

## 2019-01-13 NOTE — TELEPHONE ENCOUNTER
Spoke to patient and informed of urine culture results, need for him to discontinue Bactrim and begin Levaquin. Prescription originally sent to [facility], patient requests [facility]. [facility] cancelled and new Rx Levaquin sent to [facility] per patient request.

## 2019-03-25 ENCOUNTER — OFFICE VISIT (OUTPATIENT)
Dept: FAMILY MEDICINE CLINIC | Facility: CLINIC | Age: 34
End: 2019-03-25

## 2019-03-25 VITALS
SYSTOLIC BLOOD PRESSURE: 118 MMHG | HEART RATE: 70 BPM | TEMPERATURE: 98.4 F | WEIGHT: 183 LBS | OXYGEN SATURATION: 98 % | BODY MASS INDEX: 28.72 KG/M2 | RESPIRATION RATE: 17 BRPM | HEIGHT: 67 IN | DIASTOLIC BLOOD PRESSURE: 78 MMHG

## 2019-03-25 DIAGNOSIS — B36.0 TINEA VERSICOLOR: ICD-10-CM

## 2019-03-25 DIAGNOSIS — M54.40 ACUTE MIDLINE LOW BACK PAIN WITH SCIATICA, SCIATICA LATERALITY UNSPECIFIED: Primary | ICD-10-CM

## 2019-03-25 PROCEDURE — 96372 THER/PROPH/DIAG INJ SC/IM: CPT | Performed by: NURSE PRACTITIONER

## 2019-03-25 PROCEDURE — 99214 OFFICE O/P EST MOD 30 MIN: CPT | Performed by: NURSE PRACTITIONER

## 2019-03-25 RX ORDER — KETOCONAZOLE 20 MG/ML
SHAMPOO TOPICAL 2 TIMES WEEKLY
Qty: 120 ML | Refills: 2 | Status: SHIPPED | OUTPATIENT
Start: 2019-03-25 | End: 2019-09-19

## 2019-03-25 RX ORDER — CYCLOBENZAPRINE HCL 10 MG
10 TABLET ORAL 3 TIMES DAILY PRN
Qty: 90 TABLET | Refills: 0 | Status: SHIPPED | OUTPATIENT
Start: 2019-03-25 | End: 2020-02-14

## 2019-03-25 RX ORDER — KETOROLAC TROMETHAMINE 30 MG/ML
60 INJECTION, SOLUTION INTRAMUSCULAR; INTRAVENOUS ONCE
Status: COMPLETED | OUTPATIENT
Start: 2019-03-25 | End: 2019-03-25

## 2019-03-25 RX ORDER — CLOTRIMAZOLE AND BETAMETHASONE DIPROPIONATE 10; .64 MG/G; MG/G
CREAM TOPICAL 2 TIMES DAILY
Qty: 45 G | Refills: 1 | Status: SHIPPED | OUTPATIENT
Start: 2019-03-25 | End: 2019-04-04

## 2019-03-25 RX ADMIN — KETOROLAC TROMETHAMINE 60 MG: 30 INJECTION, SOLUTION INTRAMUSCULAR; INTRAVENOUS at 18:18

## 2019-03-25 NOTE — PROGRESS NOTES
Subjective   Josh Brito is a 34 y.o. male.   Chief Complaint   Patient presents with   • Back Pain     back pain started hurting on saturday. pt states that he unloaded his truck and it was after that when it started.     Walk in acute visit  He unloaded a truck on Friday. Strained back. Progressively worsening   Back Pain   This is a recurrent problem. The current episode started in the past 7 days. The problem occurs constantly. The problem has been gradually worsening since onset. The quality of the pain is described as aching. The pain does not radiate. The pain is at a severity of 5/10. The pain is moderate. The pain is the same all the time. The symptoms are aggravated by twisting, standing, sitting and bending. Stiffness is present in the morning. Pertinent negatives include no abdominal pain, bladder incontinence, bowel incontinence, chest pain, dysuria, fever, headaches, leg pain, numbness, paresis, paresthesias, pelvic pain, perianal numbness, tingling, weakness or weight loss. He has tried ice and heat (massage - taking ibuprofen ) for the symptoms. The treatment provided no relief.    denies loss of bowel or bladder.     Skin rash - has reoccurred to right chest wall   Was previously treated for skin yeast a few years ago.   [facility] the [employer]    The following portions of the patient's history were reviewed and updated as appropriate: allergies, current medications, past family history, past medical history, past social history, past surgical history and problem list.    Review of Systems   Constitutional: Negative for fever and weight loss.   Cardiovascular: Negative for chest pain.   Gastrointestinal: Negative for abdominal pain and bowel incontinence.   Genitourinary: Negative for bladder incontinence, dysuria and pelvic pain.   Musculoskeletal: Positive for back pain.   Neurological: Negative for tingling, weakness, numbness, headaches and paresthesias.     Past Surgical History:   Procedure  "Laterality Date   • CLAVICLE SURGERY  09/2018   • VASECTOMY       Past Medical History:   Diagnosis Date   • Contact dermatitis    • Nonvenomous insect bite of multiple sites    • Right forearm injury    • Upper respiratory infection        Objective   No Known Allergies  Visit Vitals  /78   Pulse 70   Temp 98.4 °F (36.9 °C)   Resp 17   Ht 170.2 cm (67\")   Wt 83 kg (183 lb)   SpO2 98%   BMI 28.66 kg/m²       Physical Exam   Constitutional: He is oriented to person, place, and time. He appears well-developed and well-nourished.   HENT:   Head: Normocephalic.   Eyes: Pupils are equal, round, and reactive to light.   Neck: Normal range of motion.   Cardiovascular: Normal rate and regular rhythm.   Pulmonary/Chest: Effort normal and breath sounds normal.   Abdominal: Soft. Bowel sounds are normal.   Musculoskeletal: He exhibits tenderness.   Lymphadenopathy:     He has no cervical adenopathy.   Neurological: He is alert and oriented to person, place, and time.   Skin: Skin is warm and dry. Capillary refill takes less than 2 seconds. Rash noted.   Used black light - patient has small circular areas of yellow fluorescent areas to chest wall, back and in scalp.      Psychiatric: He has a normal mood and affect. His behavior is normal.   Vitals reviewed.      Assessment/Plan   Josh was seen today for back pain.    Diagnoses and all orders for this visit:    Acute midline low back pain with sciatica, sciatica laterality unspecified  -     cyclobenzaprine (FLEXERIL) 10 MG tablet; Take 1 tablet by mouth 3 (Three) Times a Day As Needed for Muscle Spasms.  -     diclofenac (VOLTAREN) 50 MG EC tablet; Take 1 tablet by mouth 2 (Two) Times a Day.  -     ketorolac (TORADOL) injection 60 mg    Tinea versicolor  -     clotrimazole-betamethasone (LOTRISONE) 1-0.05 % cream; Apply  topically to the appropriate area as directed 2 (Two) Times a Day for 10 days. Apply thin layer twice a day for 10 days.  -     ketoconazole (NIZORAL) " 2 % shampoo; Apply  topically to the appropriate area as directed 2 (Two) Times a Week.    Patient is agreeable not to start the volteran until in the am.   Discussed the tinea versicolor treatment.   Patient verbalizes understanding.     See patient instructions for back pain and tinea versicolor.     Follow up with your PCP as needed.          Patient Instructions   Acute Back Pain, Adult  Acute back pain is sudden and usually short-lived. It is often caused by an injury to the muscles and tissues in the back. The injury may result from:  · A muscle or ligament getting overstretched or torn (strained). Ligaments are tissues that connect bones to each other. Lifting something improperly can cause a back strain.  · Wear and tear (degeneration) of the spinal disks. Spinal disks are circular tissue that provides cushioning between the bones of the spine (vertebrae).  · Twisting motions, such as while playing sports or doing yard work.  · A hit to the back.  · Arthritis.    You may have a physical exam, lab tests, and imaging tests to find the cause of your pain. Acute back pain usually goes away with rest and home care.  Follow these instructions at home:  Managing pain, stiffness, and swelling  · Take over-the-counter and prescription medicines only as told by your health care provider.  · Your health care provider may recommend applying ice during the first 24-48 hours after your pain starts. To do this:  ? Put ice in a plastic bag.  ? Place a towel between your skin and the bag.  ? Leave the ice on for 20 minutes, 2-3 times a day.  · If directed, apply heat to the affected area as often as told by your health care provider. Use the heat source that your health care provider recommends, such as a moist heat pack or a heating pad.  ? Place a towel between your skin and the heat source.  ? Leave the heat on for 20-30 minutes.  ? Remove the heat if your skin turns bright red. This is especially important if you are  "unable to feel pain, heat, or cold. You have a greater risk of getting burned.  Activity  · Do not stay in bed. Staying in bed for more than 1-2 days can delay your recovery.  · Sit up and stand up straight. Avoid leaning forward when you sit, or hunching over when you stand.  ? If you work at a desk, sit close to it so you do not need to lean over. Keep your chin tucked in. Keep your neck drawn back, and keep your elbows bent at a right angle. Your arms should look like the letter \"L.\"  ? Sit high and close to the steering wheel when you drive. Add lower back (lumbar) support to your car seat, if needed.  · Take short walks on even surfaces as soon as you are able. Try to increase the length of time you walk each day.  · Do not sit, drive, or  one place for more than 30 minutes at a time. Sitting or standing for long periods of time can put stress on your back.  · Do not drive or use heavy machinery while taking prescription pain medicine.  · Use proper lifting techniques. When you bend and lift, use positions that put less stress on your back:  ? Bend your knees.  ? Keep the load close to your body.  ? Avoid twisting.  · Exercise regularly as told by your health care provider. Exercising helps your back heal faster and helps prevent back injuries by keeping muscles strong and flexible.  · Work with a physical therapist to make a safe exercise program, as recommended by your health care provider. Do any exercises as told by your physical therapist.  Lifestyle  · Maintain a healthy weight. Extra weight puts stress on your back and makes it difficult to have good posture.  · Avoid activities or situations that make you feel anxious or stressed. Stress and anxiety increase muscle tension and can make back pain worse. Learn ways to manage anxiety and stress, such as through exercise.  General instructions  · Sleep on a firm mattress in a comfortable position. Try lying on your side with your knees slightly " bent. If you lie on your back, put a pillow under your knees.  · Follow your treatment plan as told by your health care provider. This may include:  ? Cognitive or behavioral therapy.  ? Acupuncture or massage therapy.  ? Meditation or yoga.  Contact a health care provider if:  · You have pain that is not relieved with rest or medicine.  · You have increasing pain going down into your legs or buttocks.  · Your pain does not improve after 2 weeks.  · You have pain at night.  · You lose weight without trying.  · You have a fever or chills.  Get help right away if:  · You develop new bowel or bladder control problems.  · You have unusual weakness or numbness in your arms or legs.  · You develop nausea or vomiting.  · You develop abdominal pain.  · You feel faint.  Summary  · Acute back pain is sudden and usually short-lived.  · Use proper lifting techniques. When you bend and lift, use positions that put less stress on your back.  · Take over-the-counter and prescription medicines and apply heat or ice as directed by your health care provider.  This information is not intended to replace advice given to you by your health care provider. Make sure you discuss any questions you have with your health care provider.  Document Released: 12/18/2006 Document Revised: 08/01/2018 Document Reviewed: 08/01/2018  [employer] Interactive Patient Education © 2019 [employer]  Tinea Versicolor  Tinea versicolor is a skin infection. It is caused by a type of yeast. It is normal for some yeast to be on your skin, but too much yeast causes this infection.  The infection causes a rash of light or dark patches on your skin. The rash is most common on the chest, back, neck, or upper arms. The infection usually does not cause other problems. If it is treated, it will probably go away in a few weeks. The infection cannot be spread from one person to another (is not contagious).  Follow these instructions at home:  · Use over-the-counter and  prescription medicines only as told by your doctor.  · Scrub your skin every day with dandruff shampoo as told by your doctor.  · Do not scratch your skin in the rash area.  · Avoid places that are hot and humid.  · Do not use tanning booths.  · Try to avoid sweating a lot.  Contact a doctor if:  · Your symptoms get worse.  · You have a fever.  · You have redness, swelling, or pain in the rash area.  · You have fluid or blood coming from your rash.  · Your rash feels warm to the touch.  · You have pus or a bad smell coming from your rash.  · Your rash comes back (recurs) after treatment.  Summary  · Tinea versicolor is a skin infection. It causes a rash of light or dark patches on your skin.  · The rash is most common on the chest, back, neck, or upper arms. This infection usually does not cause other problems.  · Use over-the-counter and prescription medicines only as told by your doctor.  · If the infection is treated, it will probably go away in a few weeks.  This information is not intended to replace advice given to you by your health care provider. Make sure you discuss any questions you have with your health care provider.  Document Released: 11/30/2009 Document Revised: 08/20/2018 Document Reviewed: 08/20/2018  [employer] Interactive Patient Education © 2019 [employer]        ROLAND Mcfadden

## 2019-03-25 NOTE — PATIENT INSTRUCTIONS
Acute Back Pain, Adult  Acute back pain is sudden and usually short-lived. It is often caused by an injury to the muscles and tissues in the back. The injury may result from:  · A muscle or ligament getting overstretched or torn (strained). Ligaments are tissues that connect bones to each other. Lifting something improperly can cause a back strain.  · Wear and tear (degeneration) of the spinal disks. Spinal disks are circular tissue that provides cushioning between the bones of the spine (vertebrae).  · Twisting motions, such as while playing sports or doing yard work.  · A hit to the back.  · Arthritis.    You may have a physical exam, lab tests, and imaging tests to find the cause of your pain. Acute back pain usually goes away with rest and home care.  Follow these instructions at home:  Managing pain, stiffness, and swelling  · Take over-the-counter and prescription medicines only as told by your health care provider.  · Your health care provider may recommend applying ice during the first 24-48 hours after your pain starts. To do this:  ? Put ice in a plastic bag.  ? Place a towel between your skin and the bag.  ? Leave the ice on for 20 minutes, 2-3 times a day.  · If directed, apply heat to the affected area as often as told by your health care provider. Use the heat source that your health care provider recommends, such as a moist heat pack or a heating pad.  ? Place a towel between your skin and the heat source.  ? Leave the heat on for 20-30 minutes.  ? Remove the heat if your skin turns bright red. This is especially important if you are unable to feel pain, heat, or cold. You have a greater risk of getting burned.  Activity  · Do not stay in bed. Staying in bed for more than 1-2 days can delay your recovery.  · Sit up and stand up straight. Avoid leaning forward when you sit, or hunching over when you stand.  ? If you work at a desk, sit close to it so you do not need to lean over. Keep your chin tucked  "in. Keep your neck drawn back, and keep your elbows bent at a right angle. Your arms should look like the letter \"L.\"  ? Sit high and close to the steering wheel when you drive. Add lower back (lumbar) support to your car seat, if needed.  · Take short walks on even surfaces as soon as you are able. Try to increase the length of time you walk each day.  · Do not sit, drive, or  one place for more than 30 minutes at a time. Sitting or standing for long periods of time can put stress on your back.  · Do not drive or use heavy machinery while taking prescription pain medicine.  · Use proper lifting techniques. When you bend and lift, use positions that put less stress on your back:  ? Bend your knees.  ? Keep the load close to your body.  ? Avoid twisting.  · Exercise regularly as told by your health care provider. Exercising helps your back heal faster and helps prevent back injuries by keeping muscles strong and flexible.  · Work with a physical therapist to make a safe exercise program, as recommended by your health care provider. Do any exercises as told by your physical therapist.  Lifestyle  · Maintain a healthy weight. Extra weight puts stress on your back and makes it difficult to have good posture.  · Avoid activities or situations that make you feel anxious or stressed. Stress and anxiety increase muscle tension and can make back pain worse. Learn ways to manage anxiety and stress, such as through exercise.  General instructions  · Sleep on a firm mattress in a comfortable position. Try lying on your side with your knees slightly bent. If you lie on your back, put a pillow under your knees.  · Follow your treatment plan as told by your health care provider. This may include:  ? Cognitive or behavioral therapy.  ? Acupuncture or massage therapy.  ? Meditation or yoga.  Contact a health care provider if:  · You have pain that is not relieved with rest or medicine.  · You have increasing pain going down " into your legs or buttocks.  · Your pain does not improve after 2 weeks.  · You have pain at night.  · You lose weight without trying.  · You have a fever or chills.  Get help right away if:  · You develop new bowel or bladder control problems.  · You have unusual weakness or numbness in your arms or legs.  · You develop nausea or vomiting.  · You develop abdominal pain.  · You feel faint.  Summary  · Acute back pain is sudden and usually short-lived.  · Use proper lifting techniques. When you bend and lift, use positions that put less stress on your back.  · Take over-the-counter and prescription medicines and apply heat or ice as directed by your health care provider.  This information is not intended to replace advice given to you by your health care provider. Make sure you discuss any questions you have with your health care provider.  Document Released: 12/18/2006 Document Revised: 08/01/2018 Document Reviewed: 08/01/2018  [employer] Interactive Patient Education © 2019 [employer]  Tinea Versicolor  Tinea versicolor is a skin infection. It is caused by a type of yeast. It is normal for some yeast to be on your skin, but too much yeast causes this infection.  The infection causes a rash of light or dark patches on your skin. The rash is most common on the chest, back, neck, or upper arms. The infection usually does not cause other problems. If it is treated, it will probably go away in a few weeks. The infection cannot be spread from one person to another (is not contagious).  Follow these instructions at home:  · Use over-the-counter and prescription medicines only as told by your doctor.  · Scrub your skin every day with dandruff shampoo as told by your doctor.  · Do not scratch your skin in the rash area.  · Avoid places that are hot and humid.  · Do not use tanning booths.  · Try to avoid sweating a lot.  Contact a doctor if:  · Your symptoms get worse.  · You have a fever.  · You have redness, swelling, or  pain in the rash area.  · You have fluid or blood coming from your rash.  · Your rash feels warm to the touch.  · You have pus or a bad smell coming from your rash.  · Your rash comes back (recurs) after treatment.  Summary  · Tinea versicolor is a skin infection. It causes a rash of light or dark patches on your skin.  · The rash is most common on the chest, back, neck, or upper arms. This infection usually does not cause other problems.  · Use over-the-counter and prescription medicines only as told by your doctor.  · If the infection is treated, it will probably go away in a few weeks.  This information is not intended to replace advice given to you by your health care provider. Make sure you discuss any questions you have with your health care provider.  Document Released: 11/30/2009 Document Revised: 08/20/2018 Document Reviewed: 08/20/2018  [employer] Interactive Patient Education © 2019 [employer]

## 2019-09-18 ENCOUNTER — OFFICE VISIT (OUTPATIENT)
Dept: FAMILY MEDICINE CLINIC | Facility: CLINIC | Age: 34
End: 2019-09-18

## 2019-09-18 VITALS
DIASTOLIC BLOOD PRESSURE: 80 MMHG | TEMPERATURE: 99.2 F | RESPIRATION RATE: 16 BRPM | OXYGEN SATURATION: 98 % | BODY MASS INDEX: 27.72 KG/M2 | HEART RATE: 76 BPM | SYSTOLIC BLOOD PRESSURE: 118 MMHG | WEIGHT: 177 LBS

## 2019-09-18 DIAGNOSIS — Z23 IMMUNIZATION DUE: ICD-10-CM

## 2019-09-18 DIAGNOSIS — T14.8XXA ABRASION: Primary | ICD-10-CM

## 2019-09-18 PROCEDURE — 90715 TDAP VACCINE 7 YRS/> IM: CPT | Performed by: NURSE PRACTITIONER

## 2019-09-18 PROCEDURE — 99213 OFFICE O/P EST LOW 20 MIN: CPT | Performed by: NURSE PRACTITIONER

## 2019-09-18 PROCEDURE — 90471 IMMUNIZATION ADMIN: CPT | Performed by: NURSE PRACTITIONER

## 2019-09-19 NOTE — PROGRESS NOTES
Eber Brito is a 34 y.o. male.     History of Present Illness Complains of rope burn to back of right leg. Last week he was at the [employer] for his son's birthday party climbing on ropes. Rope became tangles around his ankle taking a large area of skin off.  It is not particularly painful but his wife is worried it is infected. No drainage. No streaks. No fever or nausea. No treatment.  Needs Tdap.    Outpatient Encounter Medications as of 9/18/2019   Medication Sig Dispense Refill   • cetirizine (zyrTEC) 10 MG tablet Take 10 mg by mouth Daily.     • cyclobenzaprine (FLEXERIL) 10 MG tablet Take 1 tablet by mouth 3 (Three) Times a Day As Needed for Muscle Spasms. 90 tablet 0   • diclofenac (VOLTAREN) 50 MG EC tablet Take 1 tablet by mouth 2 (Two) Times a Day. 60 tablet 2   • ketoconazole (NIZORAL) 2 % shampoo Apply  topically to the appropriate area as directed 2 (Two) Times a Week. 120 mL 2   • silver sulfadiazine (SILVADENE) 1 % cream Apply  topically to the appropriate area as directed 2 (Two) Times a Day. 50 g 0     No facility-administered encounter medications on file as of 9/18/2019.        The following portions of the patient's history were reviewed and updated as appropriate: allergies, current medications, past family history, past medical history, past social history, past surgical history and problem list.    Review of Systems   Constitutional: Negative for appetite change, fever and unexpected weight loss.   HENT: Negative for nosebleeds, sore throat and trouble swallowing.    Eyes: Negative for visual disturbance.   Respiratory: Negative for cough, shortness of breath and wheezing.    Cardiovascular: Negative for chest pain, palpitations and leg swelling.   Gastrointestinal: Negative for abdominal pain, blood in stool, constipation, diarrhea, nausea and vomiting.   Endocrine: Negative for polydipsia, polyphagia and polyuria.   Genitourinary: Negative for dysuria, frequency, hematuria  and urinary incontinence.   Musculoskeletal: Negative for arthralgias, gait problem, joint swelling and myalgias.   Skin: Positive for rash.   Neurological: Negative for dizziness, seizures, syncope and numbness.   Hematological: Negative for adenopathy. Does not bruise/bleed easily.   Psychiatric/Behavioral: Negative for sleep disturbance and depressed mood. The patient is not nervous/anxious.        Objective     Visit Vitals  /80   Pulse 76   Temp 99.2 °F (37.3 °C) (Oral)   Resp 16   Wt 80.3 kg (177 lb)   SpO2 98%   BMI 27.72 kg/m²       Physical Exam   Constitutional: He is oriented to person, place, and time. He appears well-developed and well-nourished. No distress.   HENT:   Head: Normocephalic and atraumatic.   Right Ear: Tympanic membrane and external ear normal.   Left Ear: Tympanic membrane and external ear normal.   Nose: Nose normal.   Mouth/Throat: Oropharynx is clear and moist. No oropharyngeal exudate.   Eyes: Conjunctivae are normal. Pupils are equal, round, and reactive to light. Right eye exhibits no discharge. Left eye exhibits no discharge. No scleral icterus.   Neck: Neck supple.   Cardiovascular: Normal rate.   Pulmonary/Chest: No respiratory distress. He has no wheezes.   Abdominal: He exhibits no distension and no mass. There is no tenderness.   Musculoskeletal: He exhibits no edema or deformity.   Lymphadenopathy:     He has no cervical adenopathy.   Neurological: He is alert and oriented to person, place, and time. Coordination normal.   Skin: Skin is warm and dry. Capillary refill takes less than 2 seconds. No rash noted. No erythema.   Right post lower extremity posterior aspect with abrasion approx 6x12. No induration. Slight surrounding erythema indicative of normal healing. No cords or edema.   Psychiatric: He has a normal mood and affect. His speech is normal and behavior is normal. Judgment and thought content normal.   Nursing note and vitals reviewed.        Assessment/Plan    Josh was seen today for leg injury.    Diagnoses and all orders for this visit:    Abrasion  -     silver sulfadiazine (SILVADENE) 1 % cream; Apply  topically to the appropriate area as directed 2 (Two) Times a Day.  -     Tdap Vaccine Greater Than or Equal To 6yo IM    Immunization due      VIS provided.  Discussed the nature of the disease including, risks, complications, implications, management, safe and proper use of medications. Encouraged therapeutic lifestyle changes including low calorie diet with plenty of fruits and vegetables, daily exercise, medication compliance, and keeping scheduled follow up appointments with me and any other providers. Encouraged patient to have appointment for complete physical, fasting labs, appropriate screenings, and immunizations on an annual basis.  Follow up symptoms persist or worsen or go to ER.

## 2019-09-20 ENCOUNTER — TELEPHONE (OUTPATIENT)
Dept: FAMILY MEDICINE CLINIC | Facility: CLINIC | Age: 34
End: 2019-09-20

## 2019-09-20 DIAGNOSIS — T14.8XXA ABRASION: Primary | ICD-10-CM

## 2019-09-20 RX ORDER — SULFAMETHOXAZOLE AND TRIMETHOPRIM 800; 160 MG/1; MG/1
1 TABLET ORAL 2 TIMES DAILY
Qty: 14 TABLET | Refills: 0 | Status: SHIPPED | OUTPATIENT
Start: 2019-09-20 | End: 2020-02-14

## 2019-09-20 NOTE — TELEPHONE ENCOUNTER
----- Message from Kerry Cho MA sent at 9/20/2019 11:45 AM EDT -----  Regarding: FW: BURN ON LEG  Contact: [phone]      ----- Message -----  From: Shirley Panchal RegSched Rep  Sent: 9/20/2019  10:50 AM  To: Kerry Cho MA  Subject: BURN ON LEG                                      PT FEELS LIKE HIS LEG IS INFECTED AND HE SAID [facility] TOLD HIM THAT IF THAT HAPPENED TO CALL AND HE COULD GET ANTIBIOTIC

## 2020-02-14 ENCOUNTER — OFFICE VISIT (OUTPATIENT)
Dept: FAMILY MEDICINE CLINIC | Facility: CLINIC | Age: 35
End: 2020-02-14

## 2020-02-14 VITALS
DIASTOLIC BLOOD PRESSURE: 84 MMHG | HEIGHT: 67 IN | WEIGHT: 186 LBS | OXYGEN SATURATION: 97 % | RESPIRATION RATE: 22 BRPM | TEMPERATURE: 98.8 F | HEART RATE: 76 BPM | BODY MASS INDEX: 29.19 KG/M2 | SYSTOLIC BLOOD PRESSURE: 128 MMHG

## 2020-02-14 DIAGNOSIS — N34.1 NONGONOCOCCAL URETHRITIS: Primary | ICD-10-CM

## 2020-02-14 DIAGNOSIS — B36.0 TINEA VERSICOLOR: ICD-10-CM

## 2020-02-14 DIAGNOSIS — L21.9 SEBORRHEIC DERMATITIS OF SCALP: ICD-10-CM

## 2020-02-14 PROCEDURE — 99214 OFFICE O/P EST MOD 30 MIN: CPT | Performed by: NURSE PRACTITIONER

## 2020-02-14 RX ORDER — LEVOFLOXACIN 750 MG/1
750 TABLET ORAL DAILY
Qty: 7 TABLET | Refills: 0 | Status: SHIPPED | OUTPATIENT
Start: 2020-02-14 | End: 2020-02-21

## 2020-02-14 RX ORDER — AZITHROMYCIN 250 MG/1
1000 TABLET, FILM COATED ORAL ONCE
Qty: 4 TABLET | Refills: 0 | Status: SHIPPED | OUTPATIENT
Start: 2020-02-14 | End: 2020-02-14

## 2020-02-14 RX ORDER — CLOTRIMAZOLE 1 G/ML
SOLUTION TOPICAL 2 TIMES DAILY
Qty: 60 ML | Refills: 2 | Status: SHIPPED | OUTPATIENT
Start: 2020-02-14 | End: 2021-08-16 | Stop reason: SDUPTHER

## 2020-02-14 RX ORDER — KETOCONAZOLE 20 MG/ML
SHAMPOO TOPICAL 2 TIMES WEEKLY
Qty: 120 ML | Refills: 5 | Status: SHIPPED | OUTPATIENT
Start: 2020-02-17 | End: 2021-08-16 | Stop reason: SDUPTHER

## 2020-02-14 NOTE — PROGRESS NOTES
Josh Brito is a 35 y.o. male who presents for dermatitis and urethritis.    Chief Complaint   Patient presents with   • Rash     Skin rash on body and scalp.    • Exposure to STD       Rash   This is a recurrent problem. The current episode started more than 1 year ago. The problem has been waxing and waning since onset. The affected locations include the scalp. The rash is characterized by scaling, peeling and itchiness. He was exposed to nothing. (None  ) Treatments tried: tar shampoo. The treatment provided mild relief.   Difficulty Urinating   This is a new problem. The current episode started more than 1 month ago. The problem occurs daily. The problem has been unchanged. Associated symptoms include a rash. Associated symptoms comments: None  . The symptoms are aggravated by intercourse. He has tried nothing for the symptoms. The treatment provided no relief.         Past Medical History:   Diagnosis Date   • Contact dermatitis    • Nonvenomous insect bite of multiple sites    • Right forearm injury    • Upper respiratory infection        Past Surgical History:   Procedure Laterality Date   • CLAVICLE SURGERY  09/2018   • VASECTOMY         Family History   Problem Relation Age of Onset   • No Known Problems Mother    • No Known Problems Father        Social History     Socioeconomic History   • Marital status:      Spouse name: Not on file   • Number of children: Not on file   • Years of education: Not on file   • Highest education level: Not on file   Tobacco Use   • Smoking status: Never Smoker   • Smokeless tobacco: Never Used   Substance and Sexual Activity   • Alcohol use: No   • Drug use: No   • Sexual activity: Yes     Partners: Female       No Known Allergies    ROS    Review of Systems   Genitourinary: Positive for difficulty urinating and dysuria.   Skin: Positive for dry skin and rash.       Vitals:    02/14/20 1454   BP: 128/84   Pulse: 76   Resp: 22   Temp: 98.8 °F (37.1 °C)   SpO2: 97%  "  Weight: 84.4 kg (186 lb)   Height: 170.2 cm (67\")         Current Outpatient Medications:   •  azithromycin (ZITHROMAX Z-AGATHA) 250 MG tablet, Take 4 tablets by mouth 1 (One) Time for 1 dose. Take 2 tablets the first day, then 1 tablet daily for 4 days., Disp: 4 tablet, Rfl: 0  •  clotrimazole (LOTRIMIN) 1 % external solution, Apply  topically to the appropriate area as directed 2 (Two) Times a Day., Disp: 60 mL, Rfl: 2  •  [START ON 2/17/2020] ketoconazole (NIZORAL) 2 % shampoo, Apply  topically to the appropriate area as directed 2 (Two) Times a Week., Disp: 120 mL, Rfl: 5  •  levoFLOXacin (LEVAQUIN) 750 MG tablet, Take 1 tablet by mouth Daily for 7 days., Disp: 7 tablet, Rfl: 0    PE    Physical Exam   Constitutional: He is oriented to person, place, and time. He appears well-developed and well-nourished.   HENT:   Head: Normocephalic and atraumatic.   Cardiovascular: Normal rate, regular rhythm and normal heart sounds. Exam reveals no gallop and no friction rub.   No murmur heard.  Pulmonary/Chest: Effort normal and breath sounds normal. No stridor. No respiratory distress. He has no wheezes. He has no rales.   Neurological: He is alert and oriented to person, place, and time.   Skin: Skin is warm and dry. Rash noted. Rash is macular and urticarial.        Psychiatric: He has a normal mood and affect. His behavior is normal. Judgment and thought content normal.   Nursing note and vitals reviewed.       A/P    Problem List Items Addressed This Visit     None      Visit Diagnoses     Nongonococcal urethritis    -  Primary    Relevant Medications    azithromycin (ZITHROMAX Z-AGATHA) 250 MG tablet    levoFLOXacin (LEVAQUIN) 750 MG tablet    Tinea versicolor        Relevant Medications    clotrimazole (LOTRIMIN) 1 % external solution    ketoconazole (NIZORAL) 2 % shampoo (Start on 2/17/2020)    Seborrheic dermatitis of scalp        Relevant Medications    clotrimazole (LOTRIMIN) 1 % external solution    ketoconazole " (NIZORAL) 2 % shampoo (Start on 2/17/2020)          Plan of care reviewed with patient at the conclusion of today's visit. Education was provided regarding diagnosis, management and any prescribed or recommended OTC medications. Patient verbalizes understanding of and agreement with management plan.    Return if symptoms worsen or fail to improve.     Cheyanne De Paz, APRN

## 2021-08-16 ENCOUNTER — OFFICE VISIT (OUTPATIENT)
Dept: FAMILY MEDICINE CLINIC | Facility: CLINIC | Age: 36
End: 2021-08-16

## 2021-08-16 VITALS
OXYGEN SATURATION: 97 % | WEIGHT: 187.4 LBS | DIASTOLIC BLOOD PRESSURE: 86 MMHG | BODY MASS INDEX: 29.41 KG/M2 | SYSTOLIC BLOOD PRESSURE: 120 MMHG | HEIGHT: 67 IN | TEMPERATURE: 98.3 F | RESPIRATION RATE: 17 BRPM | HEART RATE: 82 BPM

## 2021-08-16 DIAGNOSIS — Z00.00 HEALTH MAINTENANCE EXAMINATION: Primary | ICD-10-CM

## 2021-08-16 DIAGNOSIS — B35.0 TINEA CAPITIS: ICD-10-CM

## 2021-08-16 PROCEDURE — 99395 PREV VISIT EST AGE 18-39: CPT | Performed by: NURSE PRACTITIONER

## 2021-08-16 RX ORDER — KETOCONAZOLE 20 MG/ML
SHAMPOO TOPICAL 2 TIMES WEEKLY
Qty: 120 ML | Refills: 5 | Status: SHIPPED | OUTPATIENT
Start: 2021-08-16

## 2021-08-16 RX ORDER — CLOTRIMAZOLE 1 G/ML
SOLUTION TOPICAL 2 TIMES DAILY
Qty: 60 ML | Refills: 2 | Status: SHIPPED | OUTPATIENT
Start: 2021-08-16

## 2021-08-16 NOTE — PROGRESS NOTES
Subjective   Chief Complaint   Patient presents with   • Head sores     Thinks it may be yeast on his scalp Been going on about 6 months        Josh Brito is a 36 y.o. male here today for annual exam.    Overall healthy: Yes  Regular exercise:  Yes  Diet is well balanced:  Yes  Vitamin Supplement:  Yes  Alcohol intake:  No   Tobacco use:  No    Cardiovascular risk is low:  Yes   PSA: No  ED or bedroom issues: No  Concern for STDs:  No  Last colon screening:  No  Regular dental exam:  Yes   Regular eye exam:  Yes  Immunizations up to date:  Yes  Wear a seatbelt regularly:  Yes  Wear sunscreen regularly when outdoors:  No    Review of Systems    The following portions of the patient's history were reviewed and updated as appropriate: allergies, current medications, past family history, past medical history, past social history, past surgical history and problem list.    Past Medical History:   Diagnosis Date   • Contact dermatitis    • Nonvenomous insect bite of multiple sites    • Right forearm injury    • Upper respiratory infection      Past Surgical History:   Procedure Laterality Date   • CLAVICLE SURGERY  09/2018   • VASECTOMY       Family History   Problem Relation Age of Onset   • No Known Problems Mother    • No Known Problems Father      Social History     Tobacco Use   Smoking Status Never Smoker   Smokeless Tobacco Never Used     Social History     Substance and Sexual Activity   Alcohol Use No     No Known Allergies    Current Outpatient Medications on File Prior to Visit   Medication Sig   • [DISCONTINUED] clotrimazole (LOTRIMIN) 1 % external solution Apply  topically to the appropriate area as directed 2 (Two) Times a Day.   • [DISCONTINUED] ketoconazole (NIZORAL) 2 % shampoo Apply  topically to the appropriate area as directed 2 (Two) Times a Week.     No current facility-administered medications on file prior to visit.       Objective   Vitals:    08/16/21 1736   BP: 120/86   Pulse: 82   Resp: 17  "  Temp: 98.3 °F (36.8 °C)   TempSrc: Infrared   SpO2: 97%   Weight: 85 kg (187 lb 6.4 oz)   Height: 170.2 cm (67\")     Body mass index is 29.35 kg/m².    Physical Exam  Vitals and nursing note reviewed.   Constitutional:       Appearance: Normal appearance.   HENT:      Head: Normocephalic and atraumatic.      Right Ear: Tympanic membrane and ear canal normal.      Left Ear: Tympanic membrane and ear canal normal.      Nose: Nose normal.      Mouth/Throat:      Mouth: Mucous membranes are moist.      Pharynx: Oropharynx is clear.   Eyes:      Extraocular Movements: Extraocular movements intact.      Pupils: Pupils are equal, round, and reactive to light.   Cardiovascular:      Rate and Rhythm: Normal rate and regular rhythm.      Pulses: Normal pulses.      Heart sounds: Normal heart sounds. No murmur heard.     Pulmonary:      Effort: Pulmonary effort is normal.      Breath sounds: Normal breath sounds.   Abdominal:      General: Abdomen is flat. Bowel sounds are normal.      Palpations: Abdomen is soft.   Musculoskeletal:         General: Normal range of motion.      Cervical back: Normal range of motion and neck supple. No tenderness.   Skin:     General: Skin is warm and dry.      Findings: Rash present. Rash is papular and scaling.          Neurological:      General: No focal deficit present.      Mental Status: He is alert and oriented to person, place, and time.   Psychiatric:         Mood and Affect: Mood normal.         Behavior: Behavior normal.         Thought Content: Thought content normal.         Judgment: Judgment normal.         Patient's Body mass index is 29.35 kg/m². indicating that he is overweight (BMI 25-29.9). Obesity-related health conditions include the following: none. Obesity is improving with lifestyle modifications. BMI is is above average; no BMI management plan is appropriate. We discussed low calorie, low carb based diet program, portion control and increasing exercise..   "     Assessment/Plan     Current Outpatient Medications:   •  clotrimazole (LOTRIMIN) 1 % external solution, Apply  topically to the appropriate area as directed 2 (Two) Times a Day., Disp: 60 mL, Rfl: 2  •  ketoconazole (NIZORAL) 2 % shampoo, Apply  topically to the appropriate area as directed 2 (Two) Times a Week., Disp: 120 mL, Rfl: 5    Problem List Items Addressed This Visit        Health Encounters    Health maintenance examination - Primary    Current Assessment & Plan     The patient is here for health maintenance visit.  Currently, the patient consumes a healthy diet and has an adequate exercise regimen.  Screening lab work is ordered.  Immunizations were reviewed today.  Advice and education was given regarding nutrition, aerobic exercise, routine dental evaluations, routine eye exams, reproductive health, cardiovascular risk reduction, sunscreen use, self skin examination (annual dermatology evaluations) and seatbelt use (general overall safety).  Further recommendations will be given if needed after lab evaluation.  Annual wellness evaluation is recommended.           Relevant Orders    CBC & Differential    Comprehensive Metabolic Panel    Hemoglobin A1c    Hepatitis C Antibody    Lipid Panel    TSH Rfx On Abnormal To Free T4       Skin    Tinea capitis    Current Assessment & Plan     Take meds/shampoo as directed  Education provided  Do not scratch affected areas           Relevant Medications    clotrimazole (LOTRIMIN) 1 % external solution    ketoconazole (NIZORAL) 2 % shampoo                Counseling was given to patient for the following topics: appropriate exercise, healthy eating habits, disease prevention, risk factors for cancer, importance of self testicular exam, importance of immunizations, including risks and benefits, bone health and birth control counseling including side effects.     Plan of care reviewed with the patient at the conclusion of today's visit.  Education was provided  regarding diagnosis, management, and any prescribed or recommended OTC medications.  Patient verbalized understanding of and agreement with management plan.     Return in about 1 year (around 8/16/2022), or if symptoms worsen or fail to improve, for Annual.        ROLAND Barry    Please note that portions of this note were completed with a voice recognition program. Efforts were made to edit the dictations, but occasionally words are mistranscribed.

## 2021-08-16 NOTE — PATIENT INSTRUCTIONS
Rash, Adult    A rash is a change in the color of your skin. A rash can also change the way your skin feels. There are many different conditions and factors that can cause a rash.  Follow these instructions at home:  The goal of treatment is to stop the itching and keep the rash from spreading. Watch for any changes in your symptoms. Let your doctor know about them. Follow these instructions to help with your condition:  Medicine  Take or apply over-the-counter and prescription medicines only as told by your doctor. These may include medicines:  · To treat red or swollen skin (corticosteroid creams).  · To treat itching.  · To treat an allergy (oral antihistamines).  · To treat very bad symptoms (oral corticosteroids).    Skin care  · Put cool cloths (compresses) on the affected areas.  · Do not scratch or rub your skin.  · Avoid covering the rash. Make sure that the rash is exposed to air as much as possible.  Managing itching and discomfort  · Avoid hot showers or baths. These can make itching worse. A cold shower may help.  · Try taking a bath with:  ? Epsom salts. You can get these at your local pharmacy or grocery store. Follow the instructions on the package.  ? Baking soda. Pour a small amount into the bath as told by your doctor.  ? Colloidal oatmeal. You can get this at your local pharmacy or grocery store. Follow the instructions on the package.  · Try putting baking soda paste onto your skin. Stir water into baking soda until it gets like a paste.  · Try putting on a lotion that relieves itchiness (calamine lotion).  · Keep cool and out of the sun. Sweating and being hot can make itching worse.  General instructions    · Rest as needed.  · Drink enough fluid to keep your pee (urine) pale yellow.  · Wear loose-fitting clothing.  · Avoid scented soaps, detergents, and perfumes. Use gentle soaps, detergents, perfumes, and other cosmetic products.  · Avoid anything that causes your rash. Keep a journal to  "help track what causes your rash. Write down:  ? What you eat.  ? What cosmetic products you use.  ? What you drink.  ? What you wear. This includes jewelry.  · Keep all follow-up visits as told by your doctor. This is important.  Contact a doctor if:  · You sweat at night.  · You lose weight.  · You pee (urinate) more than normal.  · You pee less than normal, or you notice that your pee is a darker color than normal.  · You feel weak.  · You throw up (vomit).  · Your skin or the whites of your eyes look yellow (jaundice).  · Your skin:  ? Tingles.  ? Is numb.  · Your rash:  ? Does not go away after a few days.  ? Gets worse.  · You are:  ? More thirsty than normal.  ? More tired than normal.  · You have:  ? New symptoms.  ? Pain in your belly (abdomen).  ? A fever.  ? Watery poop (diarrhea).  Get help right away if:  · You have a fever and your symptoms suddenly get worse.  · You start to feel mixed up (confused).  · You have a very bad headache or a stiff neck.  · You have very bad joint pains or stiffness.  · You have jerky movements that you cannot control (seizure).  · Your rash covers all or most of your body. The rash may or may not be painful.  · You have blisters that:  ? Are on top of the rash.  ? Grow larger.  ? Grow together.  ? Are painful.  ? Are inside your nose or mouth.  · You have a rash that:  ? Looks like purple pinprick-sized spots all over your body.  ? Has a \"bull's eye\" or looks like a target.  ? Is red and painful, causes your skin to peel, and is not from being in the sun too long.  Summary  · A rash is a change in the color of your skin. A rash can also change the way your skin feels.  · The goal of treatment is to stop the itching and keep the rash from spreading.  · Take or apply over-the-counter and prescription medicines only as told by your doctor.  · Contact a doctor if you have new symptoms or symptoms that get worse.  · Keep all follow-up visits as told by your doctor. This is " important.  This information is not intended to replace advice given to you by your health care provider. Make sure you discuss any questions you have with your health care provider.  Document Revised: 04/10/2020 Document Reviewed: 07/22/2019  [employer] Patient Education © 2021 [employer]    Tinea Versicolor    Tinea versicolor is a skin infection. It is caused by a type of yeast. It is normal for some yeast to be on your skin, but too much yeast causes this infection.  The infection causes a rash of light or dark patches on your skin. The rash is most common on the chest, back, neck, or upper arms. The infection usually does not cause other problems. If it is treated, it will probably go away in a few weeks. The infection cannot be spread from one person to another (is not contagious).  Follow these instructions at home:  · Use over-the-counter and prescription medicines only as told by your doctor.  · Scrub your skin every day with dandruff shampoo as told by your doctor.  · Do not scratch your skin in the rash area.  · Avoid places that are hot and humid.  · Do not use tanning booths.  · Try to avoid sweating a lot.  Contact a doctor if:  · Your symptoms get worse.  · You have a fever.  · You have redness, swelling, or pain in the rash area.  · You have fluid or blood coming from your rash.  · Your rash feels warm to the touch.  · You have pus or a bad smell coming from your rash.  · Your rash comes back (recurs) after treatment.  Summary  · Tinea versicolor is a skin infection. It causes a rash of light or dark patches on your skin.  · The rash is most common on the chest, back, neck, or upper arms. This infection usually does not cause other problems.  · Use over-the-counter and prescription medicines only as told by your doctor.  · If the infection is treated, it will probably go away in a few weeks.  This information is not intended to replace advice given to you by your health care provider. Make sure you  discuss any questions you have with your health care provider.  Document Revised: 11/30/2018 Document Reviewed: 08/20/2018  [employer] Patient Education © 2021 [employer]

## 2022-05-18 ENCOUNTER — TELEPHONE (OUTPATIENT)
Dept: FAMILY MEDICINE CLINIC | Facility: CLINIC | Age: 37
End: 2022-05-18

## 2022-05-18 NOTE — TELEPHONE ENCOUNTER
Caller: Josh Brito    Relationship: Self    Best call back number: [phone]    What orders are you requesting (i.e. lab or imaging): BLOOD WORK    In what timeframe would the patient need to come in: AS SOON AS POSSIBLE    Where will you receive your lab/imaging services: IN OFFICE BLOOD DRAW    Additional notes: PATIENT IS NOT FEELING WELL AND WOULD LIKE AN ORDER FOR BLOOD WORK, WITH A POSSIBLE APPOINTMENT FOLLOWING PENDING RESULTS.

## 2022-05-19 ENCOUNTER — OFFICE VISIT (OUTPATIENT)
Dept: FAMILY MEDICINE CLINIC | Facility: CLINIC | Age: 37
End: 2022-05-19

## 2022-05-19 ENCOUNTER — LAB (OUTPATIENT)
Dept: LAB | Facility: HOSPITAL | Age: 37
End: 2022-05-19

## 2022-05-19 VITALS
DIASTOLIC BLOOD PRESSURE: 76 MMHG | TEMPERATURE: 98.9 F | HEIGHT: 67 IN | SYSTOLIC BLOOD PRESSURE: 118 MMHG | RESPIRATION RATE: 16 BRPM | BODY MASS INDEX: 29.82 KG/M2 | HEART RATE: 71 BPM | OXYGEN SATURATION: 98 % | WEIGHT: 190 LBS

## 2022-05-19 DIAGNOSIS — R53.83 FATIGUE, UNSPECIFIED TYPE: Primary | ICD-10-CM

## 2022-05-19 DIAGNOSIS — Z00.00 HEALTH MAINTENANCE EXAMINATION: ICD-10-CM

## 2022-05-19 DIAGNOSIS — E66.3 OVERWEIGHT WITH BODY MASS INDEX (BMI) 25.0-29.9: ICD-10-CM

## 2022-05-19 LAB
25(OH)D3 SERPL-MCNC: 20.8 NG/ML (ref 30–100)
ALBUMIN SERPL-MCNC: 4.6 G/DL (ref 3.5–5.2)
ALBUMIN/GLOB SERPL: 1.8 G/DL
ALP SERPL-CCNC: 57 U/L (ref 39–117)
ALT SERPL W P-5'-P-CCNC: 34 U/L (ref 1–41)
ANION GAP SERPL CALCULATED.3IONS-SCNC: 12.6 MMOL/L (ref 5–15)
AST SERPL-CCNC: 28 U/L (ref 1–40)
BASOPHILS # BLD AUTO: 0.05 10*3/MM3 (ref 0–0.2)
BASOPHILS NFR BLD AUTO: 0.8 % (ref 0–1.5)
BILIRUB SERPL-MCNC: 0.6 MG/DL (ref 0–1.2)
BUN SERPL-MCNC: 13 MG/DL (ref 6–20)
BUN/CREAT SERPL: 14.8 (ref 7–25)
CALCIUM SPEC-SCNC: 9.5 MG/DL (ref 8.6–10.5)
CHLORIDE SERPL-SCNC: 100 MMOL/L (ref 98–107)
CHOLEST SERPL-MCNC: 180 MG/DL (ref 0–200)
CO2 SERPL-SCNC: 25.4 MMOL/L (ref 22–29)
CREAT SERPL-MCNC: 0.88 MG/DL (ref 0.76–1.27)
DEPRECATED RDW RBC AUTO: 46.5 FL (ref 37–54)
EGFRCR SERPLBLD CKD-EPI 2021: 113.6 ML/MIN/1.73
EOSINOPHIL # BLD AUTO: 0.21 10*3/MM3 (ref 0–0.4)
EOSINOPHIL NFR BLD AUTO: 3.2 % (ref 0.3–6.2)
ERYTHROCYTE [DISTWIDTH] IN BLOOD BY AUTOMATED COUNT: 13.2 % (ref 12.3–15.4)
GLOBULIN UR ELPH-MCNC: 2.5 GM/DL
GLUCOSE SERPL-MCNC: 80 MG/DL (ref 65–99)
HBA1C MFR BLD: 4.9 % (ref 4.8–5.6)
HCT VFR BLD AUTO: 52.5 % (ref 37.5–51)
HCV AB SER DONR QL: NORMAL
HDLC SERPL-MCNC: 40 MG/DL (ref 40–60)
HGB BLD-MCNC: 18 G/DL (ref 13–17.7)
IMM GRANULOCYTES # BLD AUTO: 0.01 10*3/MM3 (ref 0–0.05)
IMM GRANULOCYTES NFR BLD AUTO: 0.2 % (ref 0–0.5)
LDLC SERPL CALC-MCNC: 127 MG/DL (ref 0–100)
LDLC/HDLC SERPL: 3.15 {RATIO}
LYMPHOCYTES # BLD AUTO: 1.78 10*3/MM3 (ref 0.7–3.1)
LYMPHOCYTES NFR BLD AUTO: 27.3 % (ref 19.6–45.3)
MCH RBC QN AUTO: 32.8 PG (ref 26.6–33)
MCHC RBC AUTO-ENTMCNC: 34.3 G/DL (ref 31.5–35.7)
MCV RBC AUTO: 95.6 FL (ref 79–97)
MONOCYTES # BLD AUTO: 0.71 10*3/MM3 (ref 0.1–0.9)
MONOCYTES NFR BLD AUTO: 10.9 % (ref 5–12)
NEUTROPHILS NFR BLD AUTO: 3.77 10*3/MM3 (ref 1.7–7)
NEUTROPHILS NFR BLD AUTO: 57.6 % (ref 42.7–76)
NRBC BLD AUTO-RTO: 0 /100 WBC (ref 0–0.2)
PLATELET # BLD AUTO: 233 10*3/MM3 (ref 140–450)
PMV BLD AUTO: 9.3 FL (ref 6–12)
POTASSIUM SERPL-SCNC: 4.1 MMOL/L (ref 3.5–5.2)
PROT SERPL-MCNC: 7.1 G/DL (ref 6–8.5)
RBC # BLD AUTO: 5.49 10*6/MM3 (ref 4.14–5.8)
SODIUM SERPL-SCNC: 138 MMOL/L (ref 136–145)
TRIGL SERPL-MCNC: 70 MG/DL (ref 0–150)
TSH SERPL DL<=0.05 MIU/L-ACNC: 2.87 UIU/ML (ref 0.27–4.2)
VIT B12 BLD-MCNC: 632 PG/ML (ref 211–946)
VLDLC SERPL-MCNC: 13 MG/DL (ref 5–40)
WBC NRBC COR # BLD: 6.53 10*3/MM3 (ref 3.4–10.8)

## 2022-05-19 PROCEDURE — 86803 HEPATITIS C AB TEST: CPT | Performed by: NURSE PRACTITIONER

## 2022-05-19 PROCEDURE — 85025 COMPLETE CBC W/AUTO DIFF WBC: CPT | Performed by: NURSE PRACTITIONER

## 2022-05-19 PROCEDURE — 36415 COLL VENOUS BLD VENIPUNCTURE: CPT | Performed by: NURSE PRACTITIONER

## 2022-05-19 PROCEDURE — 80053 COMPREHEN METABOLIC PANEL: CPT | Performed by: NURSE PRACTITIONER

## 2022-05-19 PROCEDURE — 84443 ASSAY THYROID STIM HORMONE: CPT | Performed by: NURSE PRACTITIONER

## 2022-05-19 PROCEDURE — 80061 LIPID PANEL: CPT | Performed by: NURSE PRACTITIONER

## 2022-05-19 PROCEDURE — 99213 OFFICE O/P EST LOW 20 MIN: CPT | Performed by: NURSE PRACTITIONER

## 2022-05-19 PROCEDURE — 82607 VITAMIN B-12: CPT | Performed by: NURSE PRACTITIONER

## 2022-05-19 PROCEDURE — 83036 HEMOGLOBIN GLYCOSYLATED A1C: CPT | Performed by: NURSE PRACTITIONER

## 2022-05-19 PROCEDURE — 82306 VITAMIN D 25 HYDROXY: CPT | Performed by: NURSE PRACTITIONER

## 2022-05-19 NOTE — ASSESSMENT & PLAN NOTE
Labs today  Continue multi-vitamin  Start taking Claritin at bed time  Pending lab results will consider testosterone check or sleep study  Patient to follow up for annual physical in 3 months

## 2022-05-19 NOTE — ASSESSMENT & PLAN NOTE
Patient's (Body mass index is 29.76 kg/m².) indicates that they are overweight with health conditions that include none . Weight is unchanged. BMI is is above average; BMI management plan is completed. We discussed low calorie, low carb based diet program, portion control and increasing exercise.

## 2022-05-19 NOTE — PROGRESS NOTES
"Chief Complaint  Fatigue (X 1 mnth.)    Subjective          Josh Brito presents to [facility]  Patient presents to office with complaints of fatigue for over 1 month. States he stays tired all day and by noon he could take a nap and sleep rest of day he feels so exhausted. Denies fever, chills, shortness of breath, chest pain or palpitations. He requests labs today. Admits he exercises and eats healthy diet. Takes multivitamin daily and Claritin. Takes Claritin in a.m. Admits he has been told lately that he snores.     Fatigue  Associated symptoms include fatigue.       Objective   Vital Signs:  /76   Pulse 71   Temp 98.9 °F (37.2 °C)   Resp 16   Ht 170.2 cm (67\")   Wt 86.2 kg (190 lb)   SpO2 98%   BMI 29.76 kg/m²   BMI is >= 25 and < 30. (Overweight) The following options were offered after discussion: nutrition counseling/recommendations      Physical Exam  Vitals and nursing note reviewed.   Constitutional:       General: He is not in acute distress.     Appearance: Normal appearance.   HENT:      Head: Normocephalic and atraumatic.      Right Ear: External ear normal.      Left Ear: External ear normal.      Nose: Nose normal.   Eyes:      Extraocular Movements: Extraocular movements intact.      Conjunctiva/sclera: Conjunctivae normal.      Pupils: Pupils are equal, round, and reactive to light.   Cardiovascular:      Rate and Rhythm: Normal rate and regular rhythm.      Heart sounds: Normal heart sounds.   Pulmonary:      Effort: Pulmonary effort is normal.      Breath sounds: Normal breath sounds.   Musculoskeletal:      Right lower leg: No edema.      Left lower leg: No edema.   Skin:     General: Skin is warm and dry.   Neurological:      Mental Status: He is alert and oriented to person, place, and time.   Psychiatric:         Mood and Affect: Mood normal.         Behavior: Behavior normal.         Thought Content: Thought content normal.         Judgment: " Judgment normal.        Result Review :                   Assessment and Plan    Diagnoses and all orders for this visit:    1. Fatigue, unspecified type (Primary)  Assessment & Plan:  Labs today  Continue multi-vitamin  Start taking Claritin at bed time  Pending lab results will consider testosterone check or sleep study  Patient to follow up for annual physical in 3 months    Orders:  -     CBC & Differential  -     Comprehensive Metabolic Panel  -     Hemoglobin A1c  -     Lipid Panel  -     TSH Rfx On Abnormal To Free T4  -     Vitamin B12  -     Vitamin D 25 Hydroxy    2. Overweight with body mass index (BMI) 25.0-29.9  Assessment & Plan:  Patient's (Body mass index is 29.76 kg/m².) indicates that they are overweight with health conditions that include none . Weight is unchanged. BMI is is above average; BMI management plan is completed. We discussed low calorie, low carb based diet program, portion control and increasing exercise.     Orders:  -     Hemoglobin A1c  -     Lipid Panel  -     TSH Rfx On Abnormal To Free T4           Follow Up   Return in about 3 months (around 8/17/2022), or if symptoms worsen or fail to improve, for Annual.  Patient was given instructions and counseling regarding his condition or for health maintenance advice. Please see specific information pulled into the AVS if appropriate.

## 2024-06-25 ENCOUNTER — LAB (OUTPATIENT)
Dept: LAB | Facility: HOSPITAL | Age: 39
End: 2024-06-25
Payer: COMMERCIAL

## 2024-06-25 ENCOUNTER — OFFICE VISIT (OUTPATIENT)
Dept: FAMILY MEDICINE CLINIC | Facility: CLINIC | Age: 39
End: 2024-06-25
Payer: COMMERCIAL

## 2024-06-25 VITALS
SYSTOLIC BLOOD PRESSURE: 123 MMHG | HEIGHT: 67 IN | HEART RATE: 66 BPM | DIASTOLIC BLOOD PRESSURE: 78 MMHG | TEMPERATURE: 98 F | OXYGEN SATURATION: 98 % | WEIGHT: 192.6 LBS | BODY MASS INDEX: 30.23 KG/M2

## 2024-06-25 DIAGNOSIS — E29.1 TESTICULAR HYPOGONADISM: ICD-10-CM

## 2024-06-25 DIAGNOSIS — Z51.81 THERAPEUTIC DRUG MONITORING: ICD-10-CM

## 2024-06-25 DIAGNOSIS — Z00.00 ENCOUNTER FOR ANNUAL PHYSICAL EXAM: Primary | ICD-10-CM

## 2024-06-25 DIAGNOSIS — Z00.00 ENCOUNTER FOR ANNUAL PHYSICAL EXAM: ICD-10-CM

## 2024-06-25 LAB
25(OH)D3 SERPL-MCNC: 31.5 NG/ML (ref 30–100)
ALBUMIN SERPL-MCNC: 4.3 G/DL (ref 3.5–5.2)
ALBUMIN/GLOB SERPL: 1.7 G/DL
ALP SERPL-CCNC: 78 U/L (ref 39–117)
ALT SERPL W P-5'-P-CCNC: 35 U/L (ref 1–41)
ANION GAP SERPL CALCULATED.3IONS-SCNC: 13 MMOL/L (ref 5–15)
AST SERPL-CCNC: 17 U/L (ref 1–40)
BASOPHILS # BLD AUTO: 0.07 10*3/MM3 (ref 0–0.2)
BASOPHILS NFR BLD AUTO: 0.7 % (ref 0–1.5)
BILIRUB SERPL-MCNC: 0.3 MG/DL (ref 0–1.2)
BUN SERPL-MCNC: 19 MG/DL (ref 6–20)
BUN/CREAT SERPL: 25.7 (ref 7–25)
CALCIUM SPEC-SCNC: 9.1 MG/DL (ref 8.6–10.5)
CHLORIDE SERPL-SCNC: 104 MMOL/L (ref 98–107)
CHOLEST SERPL-MCNC: 176 MG/DL (ref 0–200)
CO2 SERPL-SCNC: 25 MMOL/L (ref 22–29)
CREAT SERPL-MCNC: 0.74 MG/DL (ref 0.76–1.27)
DEPRECATED RDW RBC AUTO: 42.4 FL (ref 37–54)
EGFRCR SERPLBLD CKD-EPI 2021: 118.2 ML/MIN/1.73
EOSINOPHIL # BLD AUTO: 0.32 10*3/MM3 (ref 0–0.4)
EOSINOPHIL NFR BLD AUTO: 3.3 % (ref 0.3–6.2)
ERYTHROCYTE [DISTWIDTH] IN BLOOD BY AUTOMATED COUNT: 12.4 % (ref 12.3–15.4)
GLOBULIN UR ELPH-MCNC: 2.6 GM/DL
GLUCOSE SERPL-MCNC: 95 MG/DL (ref 65–99)
HBA1C MFR BLD: 5.1 % (ref 4.8–5.6)
HCT VFR BLD AUTO: 44 % (ref 37.5–51)
HDLC SERPL-MCNC: 49 MG/DL (ref 40–60)
HGB BLD-MCNC: 14.9 G/DL (ref 13–17.7)
IMM GRANULOCYTES # BLD AUTO: 0.04 10*3/MM3 (ref 0–0.05)
IMM GRANULOCYTES NFR BLD AUTO: 0.4 % (ref 0–0.5)
LDLC SERPL CALC-MCNC: 99 MG/DL (ref 0–100)
LDLC/HDLC SERPL: 1.94 {RATIO}
LYMPHOCYTES # BLD AUTO: 2.77 10*3/MM3 (ref 0.7–3.1)
LYMPHOCYTES NFR BLD AUTO: 28.9 % (ref 19.6–45.3)
MCH RBC QN AUTO: 31.5 PG (ref 26.6–33)
MCHC RBC AUTO-ENTMCNC: 33.9 G/DL (ref 31.5–35.7)
MCV RBC AUTO: 93 FL (ref 79–97)
MONOCYTES # BLD AUTO: 0.87 10*3/MM3 (ref 0.1–0.9)
MONOCYTES NFR BLD AUTO: 9.1 % (ref 5–12)
NEUTROPHILS NFR BLD AUTO: 5.52 10*3/MM3 (ref 1.7–7)
NEUTROPHILS NFR BLD AUTO: 57.6 % (ref 42.7–76)
NRBC BLD AUTO-RTO: 0 /100 WBC (ref 0–0.2)
PLATELET # BLD AUTO: 259 10*3/MM3 (ref 140–450)
PMV BLD AUTO: 9.3 FL (ref 6–12)
POTASSIUM SERPL-SCNC: 3.8 MMOL/L (ref 3.5–5.2)
PROT SERPL-MCNC: 6.9 G/DL (ref 6–8.5)
RBC # BLD AUTO: 4.73 10*6/MM3 (ref 4.14–5.8)
SODIUM SERPL-SCNC: 142 MMOL/L (ref 136–145)
TRIGL SERPL-MCNC: 160 MG/DL (ref 0–150)
TSH SERPL DL<=0.05 MIU/L-ACNC: 1.57 UIU/ML (ref 0.27–4.2)
VIT B12 BLD-MCNC: 737 PG/ML (ref 211–946)
VLDLC SERPL-MCNC: 28 MG/DL (ref 5–40)
WBC NRBC COR # BLD AUTO: 9.59 10*3/MM3 (ref 3.4–10.8)

## 2024-06-25 PROCEDURE — 80061 LIPID PANEL: CPT

## 2024-06-25 PROCEDURE — 84402 ASSAY OF FREE TESTOSTERONE: CPT

## 2024-06-25 PROCEDURE — 99395 PREV VISIT EST AGE 18-39: CPT | Performed by: FAMILY MEDICINE

## 2024-06-25 PROCEDURE — 84443 ASSAY THYROID STIM HORMONE: CPT

## 2024-06-25 PROCEDURE — 82607 VITAMIN B-12: CPT

## 2024-06-25 PROCEDURE — 85025 COMPLETE CBC W/AUTO DIFF WBC: CPT

## 2024-06-25 PROCEDURE — 36415 COLL VENOUS BLD VENIPUNCTURE: CPT

## 2024-06-25 PROCEDURE — 80053 COMPREHEN METABOLIC PANEL: CPT

## 2024-06-25 PROCEDURE — 84403 ASSAY OF TOTAL TESTOSTERONE: CPT

## 2024-06-25 PROCEDURE — 82306 VITAMIN D 25 HYDROXY: CPT

## 2024-06-25 PROCEDURE — 83036 HEMOGLOBIN GLYCOSYLATED A1C: CPT

## 2024-06-25 RX ORDER — LORATADINE 10 MG/1
10 TABLET ORAL DAILY
COMMUNITY

## 2024-06-25 NOTE — ASSESSMENT & PLAN NOTE
- took replacement for a short time and noted a difference in energy levels, recheck T levels today and I will give recommendations when available.

## 2024-06-25 NOTE — PROGRESS NOTES
Subjective     Chief Complaint  Annual Exam and Med Refill (Per patient he wants to discuss his testosterone medication and see about possibly changing it. )    Subjective          Josh Brito is a 39 y.o. male who presents today to [facility] for follow up.    HPI:   History of Present Illness    Mr. Brito is a pleasant 39 year old male who presents today to     The following portions of the patient's history were reviewed and updated as appropriate: allergies, current medications, past family history, past medical history, past social history, past surgical history and problem list.    Objective     Objective     Allergy:   No Known Allergies     Current Medications:   Current Outpatient Medications   Medication Sig Dispense Refill    loratadine (Claritin) 10 MG tablet Take 1 tablet by mouth Daily.      Testosterone Cypionate 200 MG/ML solution Inject 1 mL as directed Every 14 (Fourteen) Days. (Patient not taking: Reported on 6/25/2024) 1.96 mL 1     No current facility-administered medications for this visit.       Past Medical History:  Past Medical History:   Diagnosis Date    Contact dermatitis     Nonvenomous insect bite of multiple sites     Right forearm injury     Upper respiratory infection        Past Surgical History:  Past Surgical History:   Procedure Laterality Date    CLAVICLE SURGERY  09/2018    VASECTOMY         Social History:  Social History     Socioeconomic History    Marital status:    Tobacco Use    Smoking status: Never     Passive exposure: Never    Smokeless tobacco: Never   Vaping Use    Vaping status: Never Used   Substance and Sexual Activity    Alcohol use: No    Drug use: No    Sexual activity: Yes     Partners: Female       Family History:  Family History   Problem Relation Age of Onset    No Known Problems Mother     No Known Problems Father          Vital Signs:   /78   Pulse 66   Temp 98 °F (36.7 °C) (Infrared)   Ht  "170.2 cm (67.01\")   Wt 87.4 kg (192 lb 9.6 oz)   SpO2 98%   BMI 30.16 kg/m²      Physical Exam:  Physical Exam  Vitals reviewed.   Constitutional:       Appearance: He is not ill-appearing.   Neck:      Thyroid: No thyromegaly.   Cardiovascular:      Rate and Rhythm: Normal rate.      Pulses: Normal pulses.   Pulmonary:      Effort: Pulmonary effort is normal.      Breath sounds: Normal breath sounds.   Neurological:      General: No focal deficit present.      Mental Status: He is alert. Mental status is at baseline.   Psychiatric:         Mood and Affect: Mood normal.         Behavior: Behavior normal.         Thought Content: Thought content normal.         Judgment: Judgment normal.               PHQ-9 Score  PHQ-9 Total Score: 0     Lab Review  No visits with results within 3 Month(s) from this visit.   Latest known visit with results is:   Lab on 06/23/2023   Component Date Value Ref Range Status    Glucose 06/23/2023 77  65 - 99 mg/dL Final    BUN 06/23/2023 15  6 - 20 mg/dL Final    Creatinine 06/23/2023 0.83  0.76 - 1.27 mg/dL Final    Sodium 06/23/2023 141  136 - 145 mmol/L Final    Potassium 06/23/2023 4.3  3.5 - 5.2 mmol/L Final    Chloride 06/23/2023 104  98 - 107 mmol/L Final    CO2 06/23/2023 25.4  22.0 - 29.0 mmol/L Final    Calcium 06/23/2023 9.8  8.6 - 10.5 mg/dL Final    Total Protein 06/23/2023 7.1  6.0 - 8.5 g/dL Final    Albumin 06/23/2023 4.6  3.5 - 5.2 g/dL Final    ALT (SGPT) 06/23/2023 56 (H)  1 - 41 U/L Final    AST (SGOT) 06/23/2023 33  1 - 40 U/L Final    Alkaline Phosphatase 06/23/2023 69  39 - 117 U/L Final    Total Bilirubin 06/23/2023 0.5  0.0 - 1.2 mg/dL Final    Globulin 06/23/2023 2.5  gm/dL Final    A/G Ratio 06/23/2023 1.8  g/dL Final    BUN/Creatinine Ratio 06/23/2023 18.1  7.0 - 25.0 Final    Anion Gap 06/23/2023 11.6  5.0 - 15.0 mmol/L Final    eGFR 06/23/2023 114.9  >60.0 mL/min/1.73 Final    Hemoglobin A1C 06/23/2023 4.90  4.80 - 5.60 % Final    Total Cholesterol " 06/23/2023 170  0 - 200 mg/dL Final    Triglycerides 06/23/2023 69  0 - 150 mg/dL Final    HDL Cholesterol 06/23/2023 53  40 - 60 mg/dL Final    LDL Cholesterol  06/23/2023 104 (H)  0 - 100 mg/dL Final    VLDL Cholesterol 06/23/2023 13  5 - 40 mg/dL Final    LDL/HDL Ratio 06/23/2023 1.95   Final    TSH 06/23/2023 2.100  0.270 - 4.200 uIU/mL Final    Vitamin B-12 06/23/2023 666  211 - 946 pg/mL Final    25 Hydroxy, Vitamin D 06/23/2023 26.5 (L)  30.0 - 100.0 ng/ml Final    Testosterone, Total 06/23/2023 297  264 - 916 ng/dL Final    Adult male reference interval is based on a population of  healthy nonobese males (BMI <30) between 19 and 39 years old.  Angelia et.al. JCEM 2017,102;5899-8563. PMID: [id number].    Testosterone, Free 06/23/2023 3.8 (L)  8.7 - 25.1 pg/mL Final    WBC 06/23/2023 6.48  3.40 - 10.80 10*3/mm3 Final    RBC 06/23/2023 5.10  4.14 - 5.80 10*6/mm3 Final    Hemoglobin 06/23/2023 16.5  13.0 - 17.7 g/dL Final    Hematocrit 06/23/2023 46.6  37.5 - 51.0 % Final    MCV 06/23/2023 91.4  79.0 - 97.0 fL Final    MCH 06/23/2023 32.4  26.6 - 33.0 pg Final    MCHC 06/23/2023 35.4  31.5 - 35.7 g/dL Final    RDW 06/23/2023 11.7 (L)  12.3 - 15.4 % Final    RDW-SD 06/23/2023 38.8  37.0 - 54.0 fl Final    MPV 06/23/2023 9.7  6.0 - 12.0 fL Final    Platelets 06/23/2023 225  140 - 450 10*3/mm3 Final    Neutrophil % 06/23/2023 61.4  42.7 - 76.0 % Final    Lymphocyte % 06/23/2023 26.5  19.6 - 45.3 % Final    Monocyte % 06/23/2023 7.7  5.0 - 12.0 % Final    Eosinophil % 06/23/2023 3.4  0.3 - 6.2 % Final    Basophil % 06/23/2023 0.8  0.0 - 1.5 % Final    Immature Grans % 06/23/2023 0.2  0.0 - 0.5 % Final    Neutrophils, Absolute 06/23/2023 3.98  1.70 - 7.00 10*3/mm3 Final    Lymphocytes, Absolute 06/23/2023 1.72  0.70 - 3.10 10*3/mm3 Final    Monocytes, Absolute 06/23/2023 0.50  0.10 - 0.90 10*3/mm3 Final    Eosinophils, Absolute 06/23/2023 0.22  0.00 - 0.40 10*3/mm3 Final    Basophils, Absolute 06/23/2023 0.05  0.00 -  0.20 10*3/mm3 Final    Immature Grans, Absolute 06/23/2023 0.01  0.00 - 0.05 10*3/mm3 Final    nRBC 06/23/2023 0.0  0.0 - 0.2 /100 WBC Final        Radiology Results        Assessment / Plan         Assessment and Plan   Diagnoses and all orders for this visit:    1. Encounter for annual physical exam (Primary)  Assessment & Plan:  Annual wellness exam completed today. Health Maintenance including immunizations was updated and reflected in the chart. Yearly screening labs were ordered.     Further recommendations to be given once lab data received.     Health advice: healthy food choices with fresh fruits and vegetables, maintain sleep pattern at least 8 hours, avoid texting and distracted driving practices; wear safety belt, engage in regular exercise, maintain healthy weight, use safe sex practices, avoid alcohol and illicit drugs. Maintain immunizations that are up to date. Maintain health maintenance: Follow up with PCP if struggling with depression or anxiety. Keep regular dental and eye exams. Brush and floss teeth daily.     I suggest they take a daily multivitamin that is age-appropriate (example women's One-A-Day.)  Patient voiced understanding of these instructions.     Follow up Annually for Physical       Orders:  -     CBC & Differential; Future  -     Comprehensive Metabolic Panel; Future  -     Hemoglobin A1c; Future  -     Lipid Panel; Future  -     TSH Rfx On Abnormal To Free T4; Future  -     Vitamin B12; Future  -     Vitamin D,25-Hydroxy; Future  -     Testosterone, Free, Total; Future  -     Testosterone; Future    2. Testicular hypogonadism  Assessment & Plan:  - took replacement for a short time and noted a difference in energy levels, recheck T levels today and I will give recommendations when available.     Orders:  -     Testosterone, Free, Total; Future  -     Testosterone; Future    3. Therapeutic drug monitoring  -     Compliance Drug Analysis, Ur - Urine, Clean Catch;  Future        Discussed possible differential diagnoses, testing, treatment, recommended non-pharmacological interventions, risks, warning signs to monitor for that would indicate need for follow-up in clinic or ER. If no improvement with these regimens or you have new or worsening symptoms follow-up. Patient verbalizes understanding and agreement with plan of care. Denies further needs or concerns.     Patient was given instructions and counseling regarding her condition and for health maintenance advised.    BMI is >= 30 and <35. (Class 1 Obesity). The following options were offered after discussion;: weight loss educational material (shared in after visit summary)       Health Maintenance  Health Maintenance:   Health Maintenance Due   Topic Date Due    COVID-19 Vaccine (1 - 2023-24 season) Never done        Meds ordered during this visit  No orders of the defined types were placed in this encounter.      Meds stopped during this visit:  There are no discontinued medications.     Visit Diagnoses    ICD-10-CM ICD-9-CM   1. Encounter for annual physical exam  Z00.00 V70.0   2. Testicular hypogonadism  E29.1 257.2   3. Therapeutic drug monitoring  Z51.81 V58.83       Patient was given instructions and counseling regarding his condition or for health maintenance advice. Please see specific information pulled into the AVS if appropriate.     Follow Up   Return in about 3 months (around 9/25/2024) for followup Low Testosterone .          This document has been electronically signed by Adriana Barth DO   June 25, 2024 08:41 EDT    Dictated Utilizing Dragon Dictation: Part of this note may be an electronic transcription/translation of spoken language to printed text using the Dragon Dictation System.    Adriana Barth D.O.  [facility] [place]

## 2024-06-25 NOTE — ASSESSMENT & PLAN NOTE
Annual wellness exam completed today. Health Maintenance including immunizations was updated and reflected in the chart. Yearly screening labs were ordered.     Further recommendations to be given once lab data received.     Health advice: healthy food choices with fresh fruits and vegetables, maintain sleep pattern at least 8 hours, avoid texting and distracted driving practices; wear safety belt, engage in regular exercise, maintain healthy weight, use safe sex practices, avoid alcohol and illicit drugs. Maintain immunizations that are up to date. Maintain health maintenance: Follow up with PCP if struggling with depression or anxiety. Keep regular dental and eye exams. Brush and floss teeth daily.     I suggest they take a daily multivitamin that is age-appropriate (example women's One-A-Day.)  Patient voiced understanding of these instructions.     Follow up Annually for Physical

## 2024-06-28 DIAGNOSIS — E29.1 TESTICULAR HYPOFUNCTION: ICD-10-CM

## 2024-06-29 LAB
TESTOST FREE SERPL-MCNC: 3.3 PG/ML (ref 8.7–25.1)
TESTOST SERPL-MCNC: 291 NG/DL (ref 264–916)

## 2024-07-01 RX ORDER — TESTOSTERONE CYPIONATE 200 MG/ML
INJECTION, SOLUTION INTRAMUSCULAR
Qty: 1 ML | Refills: 0 | Status: SHIPPED | OUTPATIENT
Start: 2024-07-01

## 2024-07-05 LAB — DRUGS UR: NORMAL

## 2024-09-26 ENCOUNTER — OFFICE VISIT (OUTPATIENT)
Dept: FAMILY MEDICINE CLINIC | Facility: CLINIC | Age: 39
End: 2024-09-26
Payer: COMMERCIAL

## 2024-09-26 VITALS
HEIGHT: 67 IN | TEMPERATURE: 97.8 F | DIASTOLIC BLOOD PRESSURE: 60 MMHG | BODY MASS INDEX: 29.76 KG/M2 | HEART RATE: 75 BPM | WEIGHT: 189.6 LBS | OXYGEN SATURATION: 98 % | SYSTOLIC BLOOD PRESSURE: 110 MMHG

## 2024-09-26 DIAGNOSIS — E29.1 TESTICULAR HYPOGONADISM: Primary | ICD-10-CM

## 2024-09-26 PROCEDURE — 99214 OFFICE O/P EST MOD 30 MIN: CPT | Performed by: FAMILY MEDICINE

## 2024-09-26 RX ORDER — TESTOSTERONE CYPIONATE 200 MG/ML
200 INJECTION, SOLUTION INTRAMUSCULAR
Qty: 4 ML | Refills: 0 | Status: SHIPPED | OUTPATIENT
Start: 2024-09-26

## 2024-09-26 RX ORDER — TESTOSTERONE CYPIONATE 200 MG/ML
200 INJECTION, SOLUTION INTRAMUSCULAR WEEKLY
Qty: 4 ML | Refills: 0 | Status: SHIPPED | OUTPATIENT
Start: 2024-09-26

## 2025-01-07 ENCOUNTER — TELEPHONE (OUTPATIENT)
Dept: FAMILY MEDICINE CLINIC | Facility: CLINIC | Age: 40
End: 2025-01-07
Payer: COMMERCIAL

## 2025-01-07 NOTE — TELEPHONE ENCOUNTER
OKAY FOR HUB TO SCHEDULE     CALLED PATIENT TO RESCHEDULE APPT FROM YESTERDAY DUE TO OFFICE BEING CLOSED FOR WEATHER.     IF PATIENT CALLS BACK PLEASE RESCHEDULE HIS APPT WITH DR KENNEY ON 01/06/2024